# Patient Record
Sex: MALE | Race: WHITE | NOT HISPANIC OR LATINO | URBAN - METROPOLITAN AREA
[De-identification: names, ages, dates, MRNs, and addresses within clinical notes are randomized per-mention and may not be internally consistent; named-entity substitution may affect disease eponyms.]

---

## 2019-09-18 LAB
EXTERNAL HIV SCREEN: NORMAL
HCV AB SER-ACNC: NEGATIVE

## 2021-06-17 ENCOUNTER — TELEPHONE (OUTPATIENT)
Dept: NEUROLOGY | Facility: CLINIC | Age: 41
End: 2021-06-17

## 2021-06-17 NOTE — TELEPHONE ENCOUNTER
Spoke with patient states he is having order faxed over I offered to schedule as self referral and records a can be faxed  Per patient having frequent headache,blurred vision history of brain tumor  Never seen by Neurology previously seen by Neurosurgery  Offer schedule first available place   on waitlist he refused appointment states he needs to be seen asap disconnected call       Headache Blurred vision /Hx of brain tumor/[employer]

## 2021-06-18 ENCOUNTER — TELEPHONE (OUTPATIENT)
Dept: NEUROLOGY | Facility: CLINIC | Age: 41
End: 2021-06-18

## 2022-10-13 ENCOUNTER — TELEPHONE (OUTPATIENT)
Dept: ADMINISTRATIVE | Facility: OTHER | Age: 42
End: 2022-10-13

## 2022-10-13 NOTE — TELEPHONE ENCOUNTER
----- Message from Enzo Umaña sent at 10/12/2022 11:30 AM EDT -----  Regarding: Care Gap Update  10/12/22 11:30 AM    Hello, our patient attached above has had Hepatitis C completed/performed  Please assist in updating the patient chart by pulling the Care Everywhere (CE) document  The date of service is 09/18/2019  Hello, our patient attached above has had HIV completed/performed  Please assist in updating the patient chart by pulling the Care Everywhere (CE) document  The date of service is 09/18/2019         Thank you,  Enzo Umaña   [facility]

## 2022-10-13 NOTE — TELEPHONE ENCOUNTER
Upon review of the In Basket request we were able to locate, review, and update the patient chart as requested for Hepatitis C  and HIV  Any additional questions or concerns should be emailed to the Practice Liaisons via the appropriate education email address, please do not reply via In Basket      Thank you  Linsey Henderson

## 2022-11-02 ENCOUNTER — OFFICE VISIT (OUTPATIENT)
Dept: FAMILY MEDICINE CLINIC | Facility: CLINIC | Age: 42
End: 2022-11-02

## 2022-11-02 VITALS
HEART RATE: 68 BPM | OXYGEN SATURATION: 98 % | BODY MASS INDEX: 29.89 KG/M2 | TEMPERATURE: 98 F | SYSTOLIC BLOOD PRESSURE: 120 MMHG | HEIGHT: 70 IN | WEIGHT: 208.8 LBS | DIASTOLIC BLOOD PRESSURE: 78 MMHG

## 2022-11-02 DIAGNOSIS — M54.50 ACUTE BILATERAL LOW BACK PAIN WITHOUT SCIATICA: ICD-10-CM

## 2022-11-02 DIAGNOSIS — Z23 NEEDS FLU SHOT: ICD-10-CM

## 2022-11-02 DIAGNOSIS — F43.0 STRESS RESPONSE: ICD-10-CM

## 2022-11-02 DIAGNOSIS — Z00.00 ANNUAL PHYSICAL EXAM: Primary | ICD-10-CM

## 2022-11-02 DIAGNOSIS — F41.9 ANXIETY: ICD-10-CM

## 2022-11-02 DIAGNOSIS — M51.37 DDD (DEGENERATIVE DISC DISEASE), LUMBOSACRAL: ICD-10-CM

## 2022-11-02 DIAGNOSIS — Z79.891 CHRONICALLY ON OPIATE THERAPY: ICD-10-CM

## 2022-11-02 PROBLEM — G44.89 OTHER HEADACHE SYNDROME: Status: ACTIVE | Noted: 2021-11-10

## 2022-11-02 RX ORDER — UBIDECARENONE 100 MG
100 CAPSULE ORAL DAILY
COMMUNITY

## 2022-11-02 RX ORDER — OXYCODONE HYDROCHLORIDE 5 MG/1
TABLET ORAL
COMMUNITY
Start: 2022-10-27

## 2022-11-02 RX ORDER — LISINOPRIL 10 MG/1
10 TABLET ORAL DAILY
COMMUNITY

## 2022-11-02 RX ORDER — ALPRAZOLAM 0.25 MG/1
TABLET ORAL
COMMUNITY
Start: 2022-10-24

## 2022-11-02 RX ORDER — AMITRIPTYLINE HYDROCHLORIDE 10 MG/1
TABLET, FILM COATED ORAL
COMMUNITY
Start: 2022-10-24

## 2022-11-02 NOTE — PATIENT INSTRUCTIONS

## 2022-11-02 NOTE — PROGRESS NOTES
ADULT ANNUAL [facility] PRIMARY CARE    NAME: Reece Coleman  AGE: 35 y o  SEX: male  : 1980     DATE: 2022     Assessment and Plan:     Problem List Items Addressed This Visit        Musculoskeletal and Integument    DDD (degenerative disc disease), lumbosacral       Other    Back pain    Chronically on opiate therapy    Stress response    Relevant Medications    ALPRAZolam (XANAX) 0 25 mg tablet    amitriptyline (ELAVIL) 10 mg tablet    Anxiety      Other Visit Diagnoses     Annual physical exam    -  Primary    BMI 29 0-29 9,adult        Needs flu shot        Relevant Orders    influenza vaccine, quadrivalent, 0 5 mL, preservative-free, for adult and pediatric patients 6 mos+ (AFLURIA, FLUARIX, FLULAVAL, FLUZONE) (Completed)          Immunizations and preventive care screenings were discussed with patient today  Appropriate education was printed on patient's after visit summary  Discussed risks and benefits of prostate cancer screening  We discussed the controversial history of PSA screening for prostate cancer in the [place] as well as the risk of over detection and over treatment of prostate cancer by way of PSA screening  The patient understands that PSA blood testing is an imperfect way to screen for prostate cancer and that elevated PSA levels in the blood may also be caused by infection, inflammation, prostatic trauma or manipulation, urological procedures, or by benign prostatic enlargement  The role of the digital rectal examination in prostate cancer screening was also discussed and I discussed with him that there is large interobserver variability in the findings of digital rectal examination  Counseling:  · Exercise: the importance of regular exercise/physical activity was discussed  Recommend exercise 3-5 times per week for at least 30 minutes  He will follow-up with his PCP for an opiate visit in December    Note placed in chart about increasing quantity prescribed of his oxycodone (takes 2-3 times a day)  Continue with medication regimen  Return in about 8 weeks (around 12/28/2022) for opiate follow-up/pain contract        Chief Complaint:     Chief Complaint   Patient presents with   • Physical Exam      History of Present Illness:     Adult Annual Physical   Patient here for a comprehensive physical exam  The patient reports here as a new patient - hx of low back issues - on pain medication for this  Hx of anxiety and increased response to stress - takes alprazolam for this  Hx of migraines/headaches - on amitriptyiline for this  Hx of HTN - on ace inhibitor       Diet and Physical Activity  · Diet/Nutrition: well balanced diet  · Exercise: moderate cardiovascular exercise  Depression Screening  PHQ-2/9 Depression Screening    Little interest or pleasure in doing things: 0 - not at all  Feeling down, depressed, or hopeless: 0 - not at all  PHQ-2 Score: 0  PHQ-2 Interpretation: Negative depression screen       General Health  · Sleep: sleeps well  · Hearing: normal - bilateral   · Vision: no vision problems  · Dental: regular dental visits   Health  · Symptoms include: none     Review of Systems:     Review of Systems   Constitutional: Negative  Respiratory: Negative  Cardiovascular: Negative  Neurological: Positive for headaches (Hx of headaches)  All other systems reviewed and are negative       Past Medical History:     Past Medical History:   Diagnosis Date   • Hypertension    • Insomnia    • Meningitis       Past Surgical History:     Past Surgical History:   Procedure Laterality Date   • ABSCESS DRAINAGE     • COLONOSCOPY  04/05/2014   • INGUINAL HERNIA REPAIR        Family History:     Family History   Problem Relation Age of Onset   • Hypertension Mother    • Hypertension Father    • Colon cancer Paternal Grandfather       Social History:     Social History     Socioeconomic History   • Marital status: /Civil Union     Spouse name: None   • Number of children: None   • Years of education: None   • Highest education level: None   Occupational History   • None   Tobacco Use   • Smoking status: Never Smoker   • Smokeless tobacco: Never Used   Vaping Use   • Vaping Use: Never used   Substance and Sexual Activity   • Alcohol use: Not Currently   • Drug use: Never   • Sexual activity: Not Currently   Other Topics Concern   • None   Social History Narrative   • None     Social Determinants of Health     Financial Resource Strain: Not on file   Food Insecurity: Not on file   Transportation Needs: Not on file   Physical Activity: Not on file   Stress: Not on file   Social Connections: Not on file   Intimate Partner Violence: Not on file   Housing Stability: Not on file      Current Medications:     Current Outpatient Medications   Medication Sig Dispense Refill   • ALPRAZolam (XANAX) 0 25 mg tablet      • amitriptyline (ELAVIL) 10 mg tablet      • co-enzyme Q-10 100 mg capsule Take 100 mg by mouth daily     • lisinopril (ZESTRIL) 10 mg tablet Take 10 mg by mouth daily     • oxyCODONE (ROXICODONE) 5 immediate release tablet        No current facility-administered medications for this visit  Allergies:     No Known Allergies   Physical Exam:     /78 (BP Location: Left arm, Patient Position: Sitting, Cuff Size: Standard)   Pulse 68   Temp 98 °F (36 7 °C)   Ht 5' 10" (1 778 m)   Wt 94 7 kg (208 lb 12 8 oz)   SpO2 98%   BMI 29 96 kg/m²     Physical Exam  Constitutional:       Appearance: Normal appearance  HENT:      Head: Normocephalic and atraumatic  Cardiovascular:      Rate and Rhythm: Normal rate and regular rhythm  Pulses:           Dorsalis pedis pulses are 2+ on the right side and 2+ on the left side  Heart sounds: No murmur heard  No gallop  Pulmonary:      Effort: Pulmonary effort is normal  No respiratory distress        Breath sounds: Normal breath sounds  No wheezing or rales  Musculoskeletal:      Cervical back: Neck supple  Feet:      Right foot:      Skin integrity: No ulcer, skin breakdown, erythema, warmth, callus or dry skin  Left foot:      Skin integrity: No ulcer, skin breakdown, erythema, warmth, callus or dry skin  Neurological:      General: No focal deficit present  Mental Status: He is alert and oriented to person, place, and time  Mental status is at baseline  Psychiatric:         Mood and Affect: Mood normal          Behavior: Behavior normal          Thought Content:  Thought content normal          Judgment: Judgment normal           MARYELLEN Villegas  [facility] PRIMARY [facility]

## 2022-11-23 DIAGNOSIS — G89.4 CHRONIC PAIN SYNDROME: Primary | ICD-10-CM

## 2022-11-23 RX ORDER — OXYCODONE HYDROCHLORIDE 5 MG/1
5 TABLET ORAL EVERY 6 HOURS PRN
Qty: 90 TABLET | Refills: 0 | Status: SHIPPED | OUTPATIENT
Start: 2022-11-23

## 2022-12-19 DIAGNOSIS — G89.4 CHRONIC PAIN SYNDROME: ICD-10-CM

## 2022-12-19 RX ORDER — OXYCODONE HYDROCHLORIDE 5 MG/1
5 TABLET ORAL EVERY 6 HOURS PRN
Qty: 90 TABLET | Refills: 0 | Status: SHIPPED | OUTPATIENT
Start: 2022-12-19

## 2022-12-28 ENCOUNTER — OFFICE VISIT (OUTPATIENT)
Dept: FAMILY MEDICINE CLINIC | Facility: CLINIC | Age: 42
End: 2022-12-28

## 2022-12-28 VITALS
OXYGEN SATURATION: 99 % | BODY MASS INDEX: 28.63 KG/M2 | TEMPERATURE: 98.7 F | WEIGHT: 200 LBS | SYSTOLIC BLOOD PRESSURE: 141 MMHG | DIASTOLIC BLOOD PRESSURE: 73 MMHG | HEART RATE: 59 BPM | HEIGHT: 70 IN

## 2022-12-28 DIAGNOSIS — G89.4 CHRONIC PAIN SYNDROME: ICD-10-CM

## 2022-12-28 DIAGNOSIS — M51.37 DDD (DEGENERATIVE DISC DISEASE), LUMBOSACRAL: Primary | ICD-10-CM

## 2022-12-28 DIAGNOSIS — I10 BENIGN ESSENTIAL HTN: ICD-10-CM

## 2022-12-28 DIAGNOSIS — F41.9 ANXIETY: ICD-10-CM

## 2022-12-28 NOTE — PROGRESS NOTES
Assessment/Plan     Problem List Items Addressed This Visit        Cardiovascular and Mediastinum    Benign essential HTN     Continue lisinopril              Musculoskeletal and Integument    DDD (degenerative disc disease), lumbosacral - Primary     Continue meds  Contract signed            Other    Anxiety     Continue prn xanax           Treatment Plan: Contract signed  meds reviewed  Refill sent  Goals reviewed      Treatment Goals: Functionality  Mobility  Enjoyment of life    Opiate risks  There are risks associated with opioid medications, including dependence, addiction and tolerance  The patient understands and agrees to use these medications only as prescribed  Potential side effects of the medications include, but are not limited to, constipation, drowsiness, addiction, impaired judgment and risk of fatal overdose if not taken as prescribed  The patient was warned against driving while taking sedation medications  Sharing medications is a felony  At this point in time, the patient is showing no signs of addiction, abuse, diversion or suicidal ideation  Opioid agreement  Pain management agreement was reviewed with patient and signed/updated during visit      PDMP review  [place] or [place]  reviewed  No red flags were identified; safe to proceed with prescription      Greater than 50% of this time was spent in counseling/coordination of care regarding: risks and benefits of treatment options  Subjective     Opioid Management:   Type of visit: Initial    Pain related diagnoses: stable, doing well    Pain controlled on meds    They allow him to work and exercise    Current pain description: Pain daily but manageable    Functional status: He has good daily functionality    Goals of care: Exercise  Work  Enjoyment of activities/life/family/dogs    [address]  Patient receives support from their: wife, daughter and son    Screening Tools/Assessments:    PHQ-2/9:  PHQ-2 score: 0    Opioid Risk Tool (ORT):  Current ORT Score: 1 (low risk for opiate abuse)    SOAPP:  Current SOAPP Score: 1 (negative, low risk patient)    Brief Pain Inventory (BPI):  1) Throughout our lives, most of us have had pain from time to time (such as minor headaches, sprains, and toothaches)  Have you had pain other than these everyday kinds of pain today? Yes  2) Where is your pain located? low back and neck  3) Rate your pain at its worst in the last 24 hours: 4  4) Rate your pain at its least in the last 24 hours: 3  5) Rate your average level of pain: 4  6) Rate your pain right now: 3  7) What treatments or medications are you receiving for your pain? oxycodone 5mg and inversion table decompression and routine exercise and tens unit  8) In the past 24 hours, how much relief have pain treatments or medication provided? 40%  9) During the past 24 hours, pain has interfered with your:     A) General activity: 2     B) Mood: 2     C) Walking ability: 0     D) Normal work (work outside the home & housework): 2     E) Relations with other people: 0     F) Sleep: 2     G) Enjoyment of life: 0    Drug Screen:  Date of last drug screen: 12/28/2022    Opioid agreement:  Active Opioid agreement on file?: No      Naloxone:  Currently prescribed Naloxone (Narcan): No    Reason not prescribing Naloxone: patient declines    High risk medications  High risk meds taken in last 72 hours: none    Other treatments tried/failed:   Ibuprofen (OTC), muscle relaxants, naproxen (OTC), home exercises, rest, heat, physical therapy, acetaminophen, prescription NSAIDs, aspirin and narcotic analgesics    Prior referrals:  Physical therapy and Pain management    Doing well  No new issues or concerns  Contract signed today  Pain is chronic and controlled on current meds for years      Pain Medications             amitriptyline (ELAVIL) 10 mg tablet     oxyCODONE (ROXICODONE) 5 immediate release tablet Take 1 tablet (5 mg total) by mouth every 6 (six) hours as needed for moderate pain Max Daily Amount: 20 mg         Outpatient Morphine Milligram Equivalents Per Day     12/28/22 and after 30 MME/Day    Order Name Dose Route Frequency Maximum MME/Day     oxyCODONE (ROXICODONE) 5 immediate release tablet 5 mg Oral Every 6 hours PRN 30 MME/Day    Total Potential Morphine Milligram Equivalents Per Day 30 MME/Day    Calculation Information        oxyCODONE (ROXICODONE) 5 immediate release tablet    oxyCODONE 5 Tabs: maximum daily dose of 20 mg * morphine equivalence factor of 1 5 = 30 MME/Day                         PDMP Review       Value Time User    PDMP Reviewed  Yes 12/19/2022 12:49 PM David Rosario MD         Review of Systems   Respiratory: Negative  Cardiovascular: Negative  Gastrointestinal: Negative  Objective     There were no vitals taken for this visit  Physical Exam  Vitals and nursing note reviewed  Constitutional:       General: He is not in acute distress  Appearance: He is well-developed  HENT:      Head: Normocephalic and atraumatic  Eyes:      Conjunctiva/sclera: Conjunctivae normal    Cardiovascular:      Rate and Rhythm: Normal rate and regular rhythm  Heart sounds: No murmur heard  Pulmonary:      Effort: Pulmonary effort is normal  No respiratory distress  Breath sounds: Normal breath sounds  Abdominal:      Palpations: Abdomen is soft  Tenderness: There is no abdominal tenderness  Musculoskeletal:         General: No swelling  Cervical back: Neck supple  Skin:     General: Skin is warm and dry  Capillary Refill: Capillary refill takes less than 2 seconds  Neurological:      Mental Status: He is alert     Psychiatric:         Mood and Affect: Mood normal          David Rosario MD

## 2022-12-30 ENCOUNTER — TELEPHONE (OUTPATIENT)
Dept: FAMILY MEDICINE CLINIC | Facility: CLINIC | Age: 42
End: 2022-12-30

## 2022-12-31 LAB
6MAM UR QL CFM: NEGATIVE NG/ML
7AMINOCLONAZEPAM UR QL CFM: NEGATIVE NG/ML
A-OH ALPRAZ UR QL CFM: ABNORMAL NG/ML
ACCEPTABLE CREAT UR QL: NORMAL MG/DL
ACCEPTIBLE SP GR UR QL: NORMAL
AMPHET UR QL CFM: NEGATIVE NG/ML
BUPRENORPHINE UR QL CFM: NEGATIVE NG/ML
BUTALBITAL UR QL CFM: NEGATIVE NG/ML
BZE UR QL CFM: NEGATIVE NG/ML
CODEINE UR QL CFM: NEGATIVE NG/ML
EDDP UR QL CFM: NEGATIVE NG/ML
ETHYL GLUCURONIDE UR QL CFM: NEGATIVE NG/ML
ETHYL SULFATE UR QL SCN: NEGATIVE NG/ML
EUTYLONE UR QL: NEGATIVE NG/ML
FENTANYL UR QL CFM: NEGATIVE NG/ML
GLIADIN IGG SER IA-ACNC: NEGATIVE NG/ML
HYDROCODONE UR QL CFM: NEGATIVE NG/ML
HYDROMORPHONE UR QL CFM: NEGATIVE NG/ML
LORAZEPAM UR QL CFM: NEGATIVE NG/ML
ME-PHENIDATE UR QL CFM: NEGATIVE NG/ML
MEPERIDINE UR QL CFM: NEGATIVE NG/ML
METHADONE UR QL CFM: NEGATIVE NG/ML
METHAMPHET UR QL CFM: NEGATIVE NG/ML
MORPHINE UR QL CFM: NEGATIVE NG/ML
NALTREXONE UR QL CFM: NEGATIVE NG/ML
NITRITE UR QL: NORMAL UG/ML
NORBUPRENORPHINE UR QL CFM: NEGATIVE NG/ML
NORDIAZEPAM UR QL CFM: NEGATIVE NG/ML
NORFENTANYL UR QL CFM: NEGATIVE NG/ML
NORHYDROCODONE UR QL CFM: NEGATIVE NG/ML
NORMEPERIDINE UR QL CFM: NEGATIVE NG/ML
NOROXYCODONE UR QL CFM: NORMAL NG/ML
OXAZEPAM UR QL CFM: NEGATIVE NG/ML
OXYCODONE UR QL CFM: NORMAL NG/ML
OXYMORPHONE UR QL CFM: NORMAL NG/ML
OXYMORPHONE UR QL CFM: NORMAL NG/ML
PARA-FLUOROFENTANYL QUANTIFICATION: NORMAL NG/ML
PHENOBARB UR QL CFM: NEGATIVE NG/ML
RESULT ALL_PRESCRIBED MEDS AND SPECIAL INSTRUCTIONS: NORMAL
SECOBARBITAL UR QL CFM: NEGATIVE NG/ML
SL AMB 4-ANPP QUANTIFICATION: NORMAL NG/ML
SL AMB 5F-ADB-M7 METABOLITE QUANTIFICATION: NEGATIVE NG/ML
SL AMB 7-OH-MITRAGYNINE (KRATOM ALKALOID) QUANTIFICATION: NEGATIVE NG/ML
SL AMB AB-FUBINACA-M3 METABOLITE QUANTIFICATION: NEGATIVE NG/ML
SL AMB ACETYL FENTANYL QUANTIFICATION: NORMAL NG/ML
SL AMB ACETYL NORFENTANYL QUANTIFICATION: NORMAL NG/ML
SL AMB ACRYL FENTANYL QUANTIFICATION: NORMAL NG/ML
SL AMB CARFENTANIL QUANTIFICATION: NORMAL NG/ML
SL AMB CTHC (MARIJUANA METABOLITE) QUANTIFICATION: NEGATIVE NG/ML
SL AMB DEXTRORPHAN (DEXTROMETHORPHAN METABOLITE) QUANT: NEGATIVE NG/ML
SL AMB GABAPENTIN QUANTIFICATION: NEGATIVE
SL AMB JWH018 METABOLITE QUANTIFICATION: NEGATIVE NG/ML
SL AMB JWH073 METABOLITE QUANTIFICATION: NEGATIVE NG/ML
SL AMB MDMB-FUBINACA-M1 METABOLITE QUANTIFICATION: NEGATIVE NG/ML
SL AMB METHYLONE QUANTIFICATION: NEGATIVE NG/ML
SL AMB N-DESMETHYL-TRAMADOL QUANTIFICATION: NEGATIVE NG/ML
SL AMB PHENTERMINE QUANTIFICATION: NEGATIVE NG/ML
SL AMB PREGABALIN QUANTIFICATION: NEGATIVE
SL AMB RCS4 METABOLITE QUANTIFICATION: NEGATIVE NG/ML
SL AMB RITALINIC ACID QUANTIFICATION: NEGATIVE NG/ML
SMOOTH MUSCLE AB TITR SER IF: NEGATIVE NG/ML
SPECIMEN DRAWN SERPL: NEGATIVE NG/ML
SPECIMEN PH ACCEPTABLE UR: NORMAL
TAPENTADOL UR QL CFM: NEGATIVE NG/ML
TEMAZEPAM UR QL CFM: NEGATIVE NG/ML
TEMAZEPAM UR QL CFM: NEGATIVE NG/ML
TRAMADOL UR QL CFM: NEGATIVE NG/ML
URATE/CREAT 24H UR: NEGATIVE NG/ML

## 2023-01-17 DIAGNOSIS — G89.4 CHRONIC PAIN SYNDROME: ICD-10-CM

## 2023-01-17 RX ORDER — OXYCODONE HYDROCHLORIDE 5 MG/1
5 TABLET ORAL EVERY 6 HOURS PRN
Qty: 90 TABLET | Refills: 0 | Status: SHIPPED | OUTPATIENT
Start: 2023-01-17

## 2023-02-14 DIAGNOSIS — G89.4 CHRONIC PAIN SYNDROME: ICD-10-CM

## 2023-02-14 RX ORDER — OXYCODONE HYDROCHLORIDE 5 MG/1
5 TABLET ORAL EVERY 6 HOURS PRN
Qty: 90 TABLET | Refills: 0 | Status: SHIPPED | OUTPATIENT
Start: 2023-02-14

## 2023-03-15 DIAGNOSIS — G89.4 CHRONIC PAIN SYNDROME: ICD-10-CM

## 2023-03-15 RX ORDER — OXYCODONE HYDROCHLORIDE 5 MG/1
5 TABLET ORAL EVERY 6 HOURS PRN
Qty: 90 TABLET | Refills: 0 | Status: SHIPPED | OUTPATIENT
Start: 2023-03-15

## 2023-03-27 ENCOUNTER — TELEPHONE (OUTPATIENT)
Dept: FAMILY MEDICINE CLINIC | Facility: CLINIC | Age: 43
End: 2023-03-27

## 2023-03-27 DIAGNOSIS — Z13.220 LIPID SCREENING: Primary | ICD-10-CM

## 2023-05-11 DIAGNOSIS — G89.4 CHRONIC PAIN SYNDROME: ICD-10-CM

## 2023-05-11 RX ORDER — OXYCODONE HYDROCHLORIDE 5 MG/1
5 TABLET ORAL EVERY 6 HOURS PRN
Qty: 90 TABLET | Refills: 0 | Status: SHIPPED | OUTPATIENT
Start: 2023-05-11

## 2023-05-30 DIAGNOSIS — I10 BENIGN ESSENTIAL HTN: Primary | ICD-10-CM

## 2023-05-30 RX ORDER — LISINOPRIL 10 MG/1
10 TABLET ORAL DAILY
Qty: 90 TABLET | Refills: 3 | Status: SHIPPED | OUTPATIENT
Start: 2023-05-30

## 2023-06-08 DIAGNOSIS — G89.4 CHRONIC PAIN SYNDROME: ICD-10-CM

## 2023-06-08 RX ORDER — OXYCODONE HYDROCHLORIDE 5 MG/1
5 TABLET ORAL EVERY 6 HOURS PRN
Qty: 90 TABLET | Refills: 0 | Status: SHIPPED | OUTPATIENT
Start: 2023-06-08

## 2023-07-06 DIAGNOSIS — G89.4 CHRONIC PAIN SYNDROME: ICD-10-CM

## 2023-07-06 RX ORDER — OXYCODONE HYDROCHLORIDE 5 MG/1
5 TABLET ORAL EVERY 6 HOURS PRN
Qty: 90 TABLET | Refills: 0 | Status: SHIPPED | OUTPATIENT
Start: 2023-07-06

## 2023-08-02 DIAGNOSIS — G89.4 CHRONIC PAIN SYNDROME: ICD-10-CM

## 2023-08-02 RX ORDER — OXYCODONE HYDROCHLORIDE 5 MG/1
5 TABLET ORAL EVERY 6 HOURS PRN
Qty: 90 TABLET | Refills: 0 | Status: SHIPPED | OUTPATIENT
Start: 2023-08-02

## 2023-08-30 DIAGNOSIS — G89.4 CHRONIC PAIN SYNDROME: ICD-10-CM

## 2023-08-30 RX ORDER — OXYCODONE HYDROCHLORIDE 5 MG/1
5 TABLET ORAL EVERY 6 HOURS PRN
Qty: 90 TABLET | Refills: 0 | Status: SHIPPED | OUTPATIENT
Start: 2023-08-30

## 2023-09-27 DIAGNOSIS — G89.4 CHRONIC PAIN SYNDROME: ICD-10-CM

## 2023-09-27 RX ORDER — OXYCODONE HYDROCHLORIDE 5 MG/1
5 TABLET ORAL EVERY 6 HOURS PRN
Qty: 90 TABLET | Refills: 0 | Status: SHIPPED | OUTPATIENT
Start: 2023-09-27

## 2023-10-04 DIAGNOSIS — F41.9 ANXIETY: Primary | ICD-10-CM

## 2023-10-04 RX ORDER — ALPRAZOLAM 0.25 MG/1
0.25 TABLET ORAL 3 TIMES DAILY PRN
Qty: 90 TABLET | Refills: 1 | Status: SHIPPED | OUTPATIENT
Start: 2023-10-04

## 2023-10-25 DIAGNOSIS — G89.4 CHRONIC PAIN SYNDROME: ICD-10-CM

## 2023-10-25 RX ORDER — OXYCODONE HYDROCHLORIDE 5 MG/1
5 TABLET ORAL EVERY 6 HOURS PRN
Qty: 90 TABLET | Refills: 0 | Status: SHIPPED | OUTPATIENT
Start: 2023-10-25

## 2023-11-02 ENCOUNTER — OFFICE VISIT (OUTPATIENT)
Dept: FAMILY MEDICINE CLINIC | Facility: CLINIC | Age: 43
End: 2023-11-02
Payer: COMMERCIAL

## 2023-11-02 VITALS
WEIGHT: 213.6 LBS | BODY MASS INDEX: 30.58 KG/M2 | SYSTOLIC BLOOD PRESSURE: 141 MMHG | HEIGHT: 70 IN | OXYGEN SATURATION: 99 % | DIASTOLIC BLOOD PRESSURE: 79 MMHG | HEART RATE: 72 BPM

## 2023-11-02 DIAGNOSIS — Z00.00 ANNUAL PHYSICAL EXAM: Primary | ICD-10-CM

## 2023-11-02 PROCEDURE — 99396 PREV VISIT EST AGE 40-64: CPT | Performed by: FAMILY MEDICINE

## 2023-11-02 NOTE — PROGRESS NOTES
Assessment/Plan:       1. Annual physical exam  Assessment & Plan:  Reviewed age-appropriate health maintenance and preventive care. Subjective:      Patient ID: Janeth Bill is a 38 y.o. male. Rivas Decker is doing great. No concerns. Meds/problems reviewed. Labs ordered. He needs no refills. He works out and exercises. Diet is good. 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 Depression Screening and Follow-up Plan: Patient was screened for depression during today's encounter. They screened negative with a PHQ-2 score of 1. The following portions of the patient's history were reviewed and updated as appropriate: allergies, current medications, past family history, past medical history, past social history, past surgical history, and problem list.    Review of Systems   Respiratory: Negative. Cardiovascular: Negative. Objective:      /79 (BP Location: Left arm, Patient Position: Sitting, Cuff Size: Large)   Pulse 72   Ht 5' 10" (1.778 m)   Wt 96.9 kg (213 lb 9.6 oz)   SpO2 99%   BMI 30.65 kg/m²          Physical Exam  Vitals and nursing note reviewed. Constitutional:       Appearance: Normal appearance. HENT:      Head: Normocephalic and atraumatic. Nose: Nose normal.      Mouth/Throat:      Mouth: Mucous membranes are moist.   Eyes:      Extraocular Movements: Extraocular movements intact. Pupils: Pupils are equal, round, and reactive to light. Cardiovascular:      Rate and Rhythm: Normal rate and regular rhythm. Pulses: Normal pulses. Pulmonary:      Effort: Pulmonary effort is normal.      Breath sounds: Normal breath sounds. Abdominal:      General: Bowel sounds are normal.      Palpations: Abdomen is soft. Musculoskeletal:      Cervical back: Normal range of motion. Skin:     General: Skin is warm and dry. Capillary Refill: Capillary refill takes less than 2 seconds. Neurological:      General: No focal deficit present.       Mental Status: He is alert.    Psychiatric:         Mood and Affect: Mood normal.

## 2023-11-21 DIAGNOSIS — G89.4 CHRONIC PAIN SYNDROME: ICD-10-CM

## 2023-11-21 RX ORDER — OXYCODONE HYDROCHLORIDE 5 MG/1
5 TABLET ORAL EVERY 6 HOURS PRN
Qty: 90 TABLET | Refills: 0 | Status: SHIPPED | OUTPATIENT
Start: 2023-11-21

## 2023-12-15 DIAGNOSIS — G89.4 CHRONIC PAIN SYNDROME: ICD-10-CM

## 2023-12-15 RX ORDER — OXYCODONE HYDROCHLORIDE 5 MG/1
5 TABLET ORAL EVERY 6 HOURS PRN
Qty: 90 TABLET | Refills: 0 | Status: SHIPPED | OUTPATIENT
Start: 2023-12-15

## 2024-01-16 DIAGNOSIS — G89.4 CHRONIC PAIN SYNDROME: ICD-10-CM

## 2024-01-16 RX ORDER — OXYCODONE HYDROCHLORIDE 5 MG/1
5 TABLET ORAL EVERY 6 HOURS PRN
Qty: 90 TABLET | Refills: 0 | Status: SHIPPED | OUTPATIENT
Start: 2024-01-16

## 2024-02-14 DIAGNOSIS — G89.4 CHRONIC PAIN SYNDROME: ICD-10-CM

## 2024-02-14 RX ORDER — OXYCODONE HYDROCHLORIDE 5 MG/1
5 TABLET ORAL EVERY 6 HOURS PRN
Qty: 90 TABLET | Refills: 0 | Status: SHIPPED | OUTPATIENT
Start: 2024-02-14

## 2024-03-14 DIAGNOSIS — G89.4 CHRONIC PAIN SYNDROME: ICD-10-CM

## 2024-03-14 RX ORDER — OXYCODONE HYDROCHLORIDE 5 MG/1
5 TABLET ORAL EVERY 6 HOURS PRN
Qty: 90 TABLET | Refills: 0 | Status: SHIPPED | OUTPATIENT
Start: 2024-03-14

## 2024-03-21 ENCOUNTER — TELEPHONE (OUTPATIENT)
Age: 44
End: 2024-03-21

## 2024-03-21 NOTE — TELEPHONE ENCOUNTER
[facility] COLON     Screened by: Marilin Chavez MA    Referring Provider pcp    Pre- Screening:     There is no height or weight on file to calculate BMI.  Has patient been referred for a routine screening Colonoscopy? no  Is the patient between 45-75 years old? yes      Previous Colonoscopy yes   If yes:    Date: 10 years    Facility:     Reason:       Does the patient want to see a Gastroenterologist prior to their procedure OR are they having any GI symptoms? no    Has the patient been hospitalized or had abdominal surgery in the past 6 months? no    Does the patient use supplemental oxygen? no    Does the patient take Coumadin, Lovenox, Plavix, Elliquis, Xarelto, or other blood thinning medication? no    Has the patient had a stroke, cardiac event, or stent placed in the past year? no     Colonoscopy scheduled    If patient is between 45yrs - 49yrs, please advise patient that we will have to confirm benefits & coverage with their insurance company for a routine screening colonoscopy.      ASC Screening    ASC Screening  BMI > than 45: No  Are you currently pregnant?: No  Do you rely on a wheelchair for mobility?: No  Do you need oxygen during the day?: No  Have you ever been informed by anesthesia that you have a difficult airway?: No  Have you been diagnosed with End Stage Renal Disease (ESRD)?: No  Are you actively on dialysis?: No  Have you been diagnosed with Pulmonary Hypertension?: No  Do you have a pacemaker or an Automatic Implantable Cardioverter Defibrillator (AICD)?: No  Have you ever had an organ transplant?: No  Have you had a stroke, heart attack, myocardial infarction (MI) within the last 6 months?: No  Have you ever been diagnosed with Aortic Stenosis?: No  Have you ever been diagnosed  with Congestive Heart Failure?: No  Have you ever been diagnosed with a heart valve disease?: No  Are you Diabetic?: No  If you are Diabetic, has your A1C been greater than 12 within the last six months?: N/A        Scheduled date of colonoscopy (as of today):5/7/2024  Physician performing colonoscopy:NANCY  Location of colonoscopy:[facility]  Bowel prep reviewed with patient:M&D  Instructions reviewed with patient by:nw  Clearances: none

## 2024-03-28 LAB
ALBUMIN SERPL-MCNC: 4.5 G/DL (ref 3.6–5.1)
ALBUMIN/GLOB SERPL: 1.7 (CALC) (ref 1–2.5)
ALP SERPL-CCNC: 59 U/L (ref 36–130)
ALT SERPL-CCNC: 14 U/L (ref 9–46)
AST SERPL-CCNC: 20 U/L (ref 10–40)
BASOPHILS # BLD AUTO: 32 CELLS/UL (ref 0–200)
BASOPHILS NFR BLD AUTO: 0.6 %
BILIRUB SERPL-MCNC: 0.6 MG/DL (ref 0.2–1.2)
BUN SERPL-MCNC: 17 MG/DL (ref 7–25)
BUN/CREAT SERPL: NORMAL (CALC) (ref 6–22)
CALCIUM SERPL-MCNC: 9.9 MG/DL (ref 8.6–10.3)
CHLORIDE SERPL-SCNC: 103 MMOL/L (ref 98–110)
CHOLEST SERPL-MCNC: 136 MG/DL
CHOLEST/HDLC SERPL: 2.6 (CALC)
CO2 SERPL-SCNC: 29 MMOL/L (ref 20–32)
CREAT SERPL-MCNC: 1.25 MG/DL (ref 0.6–1.29)
EOSINOPHIL # BLD AUTO: 248 CELLS/UL (ref 15–500)
EOSINOPHIL NFR BLD AUTO: 4.6 %
ERYTHROCYTE [DISTWIDTH] IN BLOOD BY AUTOMATED COUNT: 12.6 % (ref 11–15)
GFR/BSA.PRED SERPLBLD CYS-BASED-ARV: 72 ML/MIN/1.73M2
GLOBULIN SER CALC-MCNC: 2.6 G/DL (CALC) (ref 1.9–3.7)
GLUCOSE SERPL-MCNC: 94 MG/DL (ref 65–99)
HCT VFR BLD AUTO: 45.7 % (ref 38.5–50)
HDLC SERPL-MCNC: 52 MG/DL
HGB BLD-MCNC: 15.8 G/DL (ref 13.2–17.1)
LDLC SERPL CALC-MCNC: 67 MG/DL (CALC)
LYMPHOCYTES # BLD AUTO: 2408 CELLS/UL (ref 850–3900)
LYMPHOCYTES NFR BLD AUTO: 44.6 %
MCH RBC QN AUTO: 30.6 PG (ref 27–33)
MCHC RBC AUTO-ENTMCNC: 34.6 G/DL (ref 32–36)
MCV RBC AUTO: 88.4 FL (ref 80–100)
MONOCYTES # BLD AUTO: 486 CELLS/UL (ref 200–950)
MONOCYTES NFR BLD AUTO: 9 %
NEUTROPHILS # BLD AUTO: 2225 CELLS/UL (ref 1500–7800)
NEUTROPHILS NFR BLD AUTO: 41.2 %
NONHDLC SERPL-MCNC: 84 MG/DL (CALC)
PLATELET # BLD AUTO: 230 THOUSAND/UL (ref 140–400)
PMV BLD REES-ECKER: 10.4 FL (ref 7.5–12.5)
POTASSIUM SERPL-SCNC: 3.9 MMOL/L (ref 3.5–5.3)
PROT SERPL-MCNC: 7.1 G/DL (ref 6.1–8.1)
RBC # BLD AUTO: 5.17 MILLION/UL (ref 4.2–5.8)
SODIUM SERPL-SCNC: 140 MMOL/L (ref 135–146)
TESTOST SERPL-MCNC: 382 NG/DL (ref 250–827)
TRIGL SERPL-MCNC: 90 MG/DL
WBC # BLD AUTO: 5.4 THOUSAND/UL (ref 3.8–10.8)

## 2024-03-29 ENCOUNTER — TELEPHONE (OUTPATIENT)
Dept: FAMILY MEDICINE CLINIC | Facility: CLINIC | Age: 44
End: 2024-03-29

## 2024-04-10 ENCOUNTER — OFFICE VISIT (OUTPATIENT)
Dept: FAMILY MEDICINE CLINIC | Facility: CLINIC | Age: 44
End: 2024-04-10
Payer: COMMERCIAL

## 2024-04-10 ENCOUNTER — TELEPHONE (OUTPATIENT)
Age: 44
End: 2024-04-10

## 2024-04-10 VITALS
DIASTOLIC BLOOD PRESSURE: 78 MMHG | HEIGHT: 70 IN | WEIGHT: 215 LBS | BODY MASS INDEX: 30.78 KG/M2 | SYSTOLIC BLOOD PRESSURE: 134 MMHG | OXYGEN SATURATION: 97 % | HEART RATE: 84 BPM

## 2024-04-10 DIAGNOSIS — K92.1 BLOOD IN STOOL: ICD-10-CM

## 2024-04-10 DIAGNOSIS — R53.83 OTHER FATIGUE: ICD-10-CM

## 2024-04-10 DIAGNOSIS — K59.01 SLOW TRANSIT CONSTIPATION: Primary | ICD-10-CM

## 2024-04-10 DIAGNOSIS — R63.5 WEIGHT GAIN: ICD-10-CM

## 2024-04-10 PROCEDURE — 99214 OFFICE O/P EST MOD 30 MIN: CPT | Performed by: FAMILY MEDICINE

## 2024-04-10 NOTE — PROGRESS NOTES
Assessment/Plan:       1. Slow transit constipation  Comments:  check labs  start fiber/water/probiotic/stool softener  Orders:  -     TSH, 3rd generation with Free T4 reflex; Future; Expected date: 04/10/2024  -     T4; Future  -     T4, free; Future  -     T3; Future  -     Vitamin B12; Future  -     Vitamin D 1,25 dihydroxy; Future  -     Lyme Total AB W Reflex to IGM/IGG; Future  -     CT abdomen pelvis w contrast; Future; Expected date: 04/10/2024  -     TSH, 3rd generation with Free T4 reflex  -     T4  -     T4, free  -     T3  -     Vitamin B12  -     Vitamin D 1,25 dihydroxy  -     Lyme Total AB W Reflex to IGM/IGG    2. Blood in stool  Comments:  stool softener  pribiotic  fiber/water  metamucil  Orders:  -     TSH, 3rd generation with Free T4 reflex; Future; Expected date: 04/10/2024  -     T4; Future  -     T4, free; Future  -     T3; Future  -     Vitamin B12; Future  -     Vitamin D 1,25 dihydroxy; Future  -     Lyme Total AB W Reflex to IGM/IGG; Future  -     CT abdomen pelvis w contrast; Future; Expected date: 04/10/2024  -     TSH, 3rd generation with Free T4 reflex  -     T4  -     T4, free  -     T3  -     Vitamin B12  -     Vitamin D 1,25 dihydroxy  -     Lyme Total AB W Reflex to IGM/IGG    3. Weight gain  Comments:  check tsh  Orders:  -     TSH, 3rd generation with Free T4 reflex; Future; Expected date: 04/10/2024  -     T4; Future  -     T4, free; Future  -     T3; Future  -     Vitamin B12; Future  -     Vitamin D 1,25 dihydroxy; Future  -     Lyme Total AB W Reflex to IGM/IGG; Future  -     CT abdomen pelvis w contrast; Future; Expected date: 04/10/2024  -     TSH, 3rd generation with Free T4 reflex  -     T4  -     T4, free  -     T3  -     Vitamin B12  -     Vitamin D 1,25 dihydroxy  -     Lyme Total AB W Reflex to IGM/IGG    4. Other fatigue  Comments:  check labs  Orders:  -     TSH, 3rd generation with Free T4 reflex; Future; Expected date: 04/10/2024  -     T4; Future  -     T4, free;  "Future  -     T3; Future  -     Vitamin B12; Future  -     Vitamin D 1,25 dihydroxy; Future  -     Lyme Total AB W Reflex to IGM/IGG; Future  -     CT abdomen pelvis w contrast; Future; Expected date: 04/10/2024  -     TSH, 3rd generation with Free T4 reflex  -     T4  -     T4, free  -     T3  -     Vitamin B12  -     Vitamin D 1,25 dihydroxy  -     Lyme Total AB W Reflex to IGM/IGG          Subjective:      Patient ID: Adan Davis is a 43 y.o. male.    Patient has been experiencing right upper quadrant pain and flank pain without urinary symptoms for several weeks associated with extreme fatigue and some constipation with bright red blood when he goes to the bathroom no black stool or melena.  No fever.  No weight loss but weight gain.  His main symptoms are constipation weight gain and fatigue.        The following portions of the patient's history were reviewed and updated as appropriate: allergies, current medications, past family history, past medical history, past social history, past surgical history, and problem list.    Review of Systems   Respiratory: Negative.     Cardiovascular: Negative.    Gastrointestinal:  Positive for blood in stool.         Objective:      /78 (BP Location: Left arm, Patient Position: Sitting, Cuff Size: Large)   Pulse 84   Ht 5' 10\" (1.778 m)   Wt 97.5 kg (215 lb)   SpO2 97%   BMI 30.85 kg/m²          Physical Exam  Vitals and nursing note reviewed.   Constitutional:       Appearance: Normal appearance.   HENT:      Head: Normocephalic and atraumatic.      Nose: Nose normal.      Mouth/Throat:      Mouth: Mucous membranes are moist.   Eyes:      Extraocular Movements: Extraocular movements intact.      Pupils: Pupils are equal, round, and reactive to light.   Cardiovascular:      Rate and Rhythm: Normal rate and regular rhythm.      Pulses: Normal pulses.   Pulmonary:      Effort: Pulmonary effort is normal.      Breath sounds: Normal breath sounds.   Abdominal:      " General: Bowel sounds are normal.      Palpations: Abdomen is soft.   Musculoskeletal:      Cervical back: Normal range of motion.   Skin:     General: Skin is warm and dry.      Capillary Refill: Capillary refill takes less than 2 seconds.   Neurological:      General: No focal deficit present.      Mental Status: He is alert.   Psychiatric:         Mood and Affect: Mood normal.

## 2024-04-10 NOTE — PROGRESS NOTES
Assessment/Plan:       1. Slow transit constipation  -     TSH, 3rd generation with Free T4 reflex; Future; Expected date: 04/10/2024  -     T4; Future  -     T4, free; Future  -     T3; Future  -     Vitamin B12; Future  -     Vitamin D 1,25 dihydroxy; Future  -     Lyme Total AB W Reflex to IGM/IGG; Future  -     CT abdomen pelvis w contrast; Future; Expected date: 04/10/2024  -     TSH, 3rd generation with Free T4 reflex  -     T4  -     T4, free  -     T3  -     Vitamin B12  -     Vitamin D 1,25 dihydroxy  -     Lyme Total AB W Reflex to IGM/IGG    2. Blood in stool  Comments:  stool softener  pribiotic  fiber/water  metamucil  Orders:  -     TSH, 3rd generation with Free T4 reflex; Future; Expected date: 04/10/2024  -     T4; Future  -     T4, free; Future  -     T3; Future  -     Vitamin B12; Future  -     Vitamin D 1,25 dihydroxy; Future  -     Lyme Total AB W Reflex to IGM/IGG; Future  -     CT abdomen pelvis w contrast; Future; Expected date: 04/10/2024  -     TSH, 3rd generation with Free T4 reflex  -     T4  -     T4, free  -     T3  -     Vitamin B12  -     Vitamin D 1,25 dihydroxy  -     Lyme Total AB W Reflex to IGM/IGG    3. Weight gain  Comments:  check tsh  Orders:  -     TSH, 3rd generation with Free T4 reflex; Future; Expected date: 04/10/2024  -     T4; Future  -     T4, free; Future  -     T3; Future  -     Vitamin B12; Future  -     Vitamin D 1,25 dihydroxy; Future  -     Lyme Total AB W Reflex to IGM/IGG; Future  -     CT abdomen pelvis w contrast; Future; Expected date: 04/10/2024  -     TSH, 3rd generation with Free T4 reflex  -     T4  -     T4, free  -     T3  -     Vitamin B12  -     Vitamin D 1,25 dihydroxy  -     Lyme Total AB W Reflex to IGM/IGG    4. Other fatigue  Comments:  check labs  Orders:  -     TSH, 3rd generation with Free T4 reflex; Future; Expected date: 04/10/2024  -     T4; Future  -     T4, free; Future  -     T3; Future  -     Vitamin B12; Future  -     Vitamin D 1,25  "dihydroxy; Future  -     Lyme Total AB W Reflex to IGM/IGG; Future  -     CT abdomen pelvis w contrast; Future; Expected date: 04/10/2024  -     TSH, 3rd generation with Free T4 reflex  -     T4  -     T4, free  -     T3  -     Vitamin B12  -     Vitamin D 1,25 dihydroxy  -     Lyme Total AB W Reflex to IGM/IGG          Subjective:      Patient ID: Adan Davis is a 43 y.o. male.    5-6 months ago rightback pain that mildly radiated to his RUQ.  This was associated with fatigue.  His diet has been off the past 3-4 weeks.  He has decreased appetite.  No weight loss.  He does usually eat healthy.  He feels constipated.  He is extremely tired.  There is red blood in the stool.  No black stool.  He has to push.  He feels an incomplete evacuation.  There is discomfort going.  There is blood with wiping.  The stool is hard.  He is drinking liquids.  He does have a colonoscopy scheduled.  He continues to have pain in the ruq and right flank.      Constipation        The following portions of the patient's history were reviewed and updated as appropriate: allergies, current medications, past family history, past medical history, past social history, past surgical history, and problem list.    Review of Systems   Gastrointestinal:  Positive for constipation.         Objective:      /78 (BP Location: Left arm, Patient Position: Sitting, Cuff Size: Large)   Pulse 84   Ht 5' 10\" (1.778 m)   Wt 97.5 kg (215 lb)   SpO2 97%   BMI 30.85 kg/m²          Physical Exam  Vitals and nursing note reviewed.   Constitutional:       Appearance: Normal appearance.   HENT:      Head: Normocephalic and atraumatic.      Nose: Nose normal.      Mouth/Throat:      Mouth: Mucous membranes are moist.   Eyes:      Extraocular Movements: Extraocular movements intact.      Pupils: Pupils are equal, round, and reactive to light.   Cardiovascular:      Rate and Rhythm: Normal rate and regular rhythm.      Pulses: Normal pulses.   Pulmonary:    "   Effort: Pulmonary effort is normal.      Breath sounds: Normal breath sounds.   Abdominal:      General: Bowel sounds are normal. There is no distension.      Palpations: Abdomen is soft. There is no mass.      Tenderness: There is abdominal tenderness. There is no right CVA tenderness, left CVA tenderness, guarding or rebound.   Musculoskeletal:      Cervical back: Normal range of motion.   Skin:     General: Skin is warm and dry.      Capillary Refill: Capillary refill takes less than 2 seconds.   Neurological:      General: No focal deficit present.      Mental Status: He is alert.   Psychiatric:         Mood and Affect: Mood normal.

## 2024-04-10 NOTE — TELEPHONE ENCOUNTER
Patient contacted office for sooner procedure. No sooner dates available. Patient placed of waitlist.

## 2024-04-12 ENCOUNTER — TELEPHONE (OUTPATIENT)
Age: 44
End: 2024-04-12

## 2024-04-12 DIAGNOSIS — G89.4 CHRONIC PAIN SYNDROME: ICD-10-CM

## 2024-04-12 RX ORDER — OXYCODONE HYDROCHLORIDE 5 MG/1
5 TABLET ORAL EVERY 6 HOURS PRN
Qty: 90 TABLET | Refills: 0 | Status: SHIPPED | OUTPATIENT
Start: 2024-04-12

## 2024-04-12 NOTE — TELEPHONE ENCOUNTER
PA for oxyCODONE (ROXICODONE) 5 immediate release tablet     Submitted via    []CMM-KEY   [x]EXTR[employer]AN[employer]-Case ID # [id number]   []Faxed to plan   []Other website   []Phone call Case ID #     Office notes sent, clinical questions answered. Awaiting determination    Turnaround time for your insurance to make a decision on your Prior Authorization can take 7-21 business days.

## 2024-04-12 NOTE — TELEPHONE ENCOUNTER
PA for oxyCODONE (ROXICODONE) 5 immediate release tablet  Approved   Date(s) approved 4/12/24-10/9/24  Case #    Patient advised by [x] [employer] Message                      [x] Phone call       Pharmacy advised by [x]Fax                                     []Phone call    Approval letter scanned into Media Yes

## 2024-04-13 LAB
T3 SERPL-MCNC: 92 NG/DL (ref 76–181)
T4 FREE SERPL-MCNC: 1.3 NG/DL (ref 0.8–1.8)
T4 SERPL-MCNC: 8.1 MCG/DL (ref 4.9–10.5)
TSH SERPL-ACNC: 2.61 MIU/L (ref 0.4–4.5)
VIT B12 SERPL-MCNC: 876 PG/ML (ref 200–1100)

## 2024-04-14 LAB
B BURGDOR IGG SER IA-ACNC: <=0.9 INDEX
B BURGDOR IGG+IGM SER QL IA: 0.99 INDEX
B BURGDOR IGM SER IA-ACNC: <=0.9 INDEX

## 2024-04-15 ENCOUNTER — TELEPHONE (OUTPATIENT)
Dept: FAMILY MEDICINE CLINIC | Facility: CLINIC | Age: 44
End: 2024-04-15

## 2024-04-15 ENCOUNTER — TELEPHONE (OUTPATIENT)
Age: 44
End: 2024-04-15

## 2024-04-15 NOTE — TELEPHONE ENCOUNTER
Patient called in regards to wanting to know how long should a person wait to use the bathroom. Patient stated he has not used the bathroom in 4 days and is only having discomfort. Patient would like advise.

## 2024-04-15 NOTE — TELEPHONE ENCOUNTER
----- Message from Robert Budinetz, MD sent at 4/15/2024 10:36 AM EDT -----  Labs reviewed and are within normal limits.  Please inform the patient.

## 2024-04-16 LAB
1,25(OH)2D SERPL-MCNC: 53 PG/ML (ref 18–72)
1,25(OH)2D2 SERPL-MCNC: <8 PG/ML
1,25(OH)2D3 SERPL-MCNC: 53 PG/ML
T3 SERPL-MCNC: 92 NG/DL (ref 76–181)
T4 FREE SERPL-MCNC: 1.3 NG/DL (ref 0.8–1.8)
T4 SERPL-MCNC: 8.1 MCG/DL (ref 4.9–10.5)
TSH SERPL-ACNC: 2.61 MIU/L (ref 0.4–4.5)
VIT B12 SERPL-MCNC: 876 PG/ML (ref 200–1100)

## 2024-04-16 NOTE — TELEPHONE ENCOUNTER
Spoke with patient, he reports he has been taking colace, metamucil and increased his water intake. He has not had a bowel movement since Saturday, he denies abdominal pain, or distention, says he feels a little discomfort. He also reports passing a little gas. He is just concerned that there may be underlying issue, please advise

## 2024-04-16 NOTE — TELEPHONE ENCOUNTER
Pt decided to go to a different imaging center than originally decided on for the authorization. Could we please edit and fax to [facility] imaging at [phone]

## 2024-04-19 ENCOUNTER — TELEPHONE (OUTPATIENT)
Dept: FAMILY MEDICINE CLINIC | Facility: CLINIC | Age: 44
End: 2024-04-19

## 2024-04-19 NOTE — TELEPHONE ENCOUNTER
----- Message from Robert Budinetz, MD sent at 4/19/2024  8:37 AM EDT -----  CT scan is normal  Is he doing any better with the constipation?

## 2024-04-23 ENCOUNTER — ANESTHESIA (OUTPATIENT)
Dept: ANESTHESIOLOGY | Facility: HOSPITAL | Age: 44
End: 2024-04-23

## 2024-04-23 ENCOUNTER — OFFICE VISIT (OUTPATIENT)
Dept: FAMILY MEDICINE CLINIC | Facility: CLINIC | Age: 44
End: 2024-04-23
Payer: COMMERCIAL

## 2024-04-23 ENCOUNTER — ANESTHESIA EVENT (OUTPATIENT)
Dept: ANESTHESIOLOGY | Facility: HOSPITAL | Age: 44
End: 2024-04-23

## 2024-04-23 VITALS
SYSTOLIC BLOOD PRESSURE: 128 MMHG | HEART RATE: 58 BPM | OXYGEN SATURATION: 100 % | DIASTOLIC BLOOD PRESSURE: 73 MMHG | BODY MASS INDEX: 30.21 KG/M2 | HEIGHT: 70 IN | WEIGHT: 211 LBS

## 2024-04-23 DIAGNOSIS — I10 BENIGN ESSENTIAL HTN: ICD-10-CM

## 2024-04-23 DIAGNOSIS — K59.01 SLOW TRANSIT CONSTIPATION: Primary | ICD-10-CM

## 2024-04-23 PROCEDURE — 99214 OFFICE O/P EST MOD 30 MIN: CPT | Performed by: FAMILY MEDICINE

## 2024-04-23 NOTE — PROGRESS NOTES
Assessment/Plan     Problem List Items Addressed This Visit       Benign essential HTN     D/c lisinopril          Other Visit Diagnoses       Slow transit constipation    -  Primary    stop the sweets/candy  continue fiber/water/probiotic  cut back the oxycodone           Treatment Plan: Decrease oxycodne dosage    Treatment Goals: Allow good qol  Functionality  mobility    Opiate risks  There are risks associated with opioid medications, including dependence, addiction and tolerance. The patient understands and agrees to use these medications only as prescribed. Potential side effects of the medications include, but are not limited to, constipation, drowsiness, addiction, impaired judgment and risk of fatal overdose if not taken as prescribed. The patient was warned against driving while taking sedation medications.  Sharing medications is a felony. At this point in time, the patient is showing no signs of addiction, abuse, diversion or suicidal ideation.      [place] review  [place] or [place]  reviewed. No red flags were identified; safe to proceed with prescription      discussing prognosis and instructions for management.       Depression Screening and Follow-up Plan: Patient was screened for depression during today's encounter. They screened negative with a PHQ-2 score of 0.       Subjective     Opioid Management:   Type of visit: Follow-up    Pain related diagnoses: stable, doing well    Interval history: Chronic neck and back pain  Agreeable to decreasing the dosage/frequency    Aberrant behavior?: No      Adverse effects from medication?: No      Screening Tools/Assessments:    PHQ-2/9:  PHQ-2 score: 0    Brief Pain Inventory (BPI):  1) Throughout our lives, most of us have had pain from time to time (such as minor headaches, sprains, and toothaches). Have you had pain other than these everyday kinds of pain today? Yes  2) Where is your pain located? neck and back  3) Rate your pain at its worst in the last 24  hours: 3  4) Rate your pain at its least in the last 24 hours: 2  5) Rate your average level of pain: 3  6) Rate your pain right now: 3  7) What treatments or medications are you receiving for your pain? oxycodone  8) In the past 24 hours, how much relief have pain treatments or medication provided? 70%  9) During the past 24 hours, pain has interfered with your:     A) General activity: 0     B) Mood: 0     C) Walking ability: 2     D) Normal work (work outside the home & housework): 0     E) Relations with other people: 0     F) Sleep: 3     G) Enjoyment of life: 0    COMM:  Current COMM Score: 6 (negative, low risk patient)    Drug Screen:    Drug screen result based off current prescriptions: consistent    Opioid agreement:  Active Opioid agreement on file?: No      Naloxone:  Currently prescribed Naloxone (Narcan): No      High risk medications  High risk meds taken in last 72 hours: no    Adan is here for follow-up.  He did start the probiotic and the fiber the MiraLAX as needed he is still constipated.  He goes every 3 to 4 days CT scan was normal did not comment on constipation or any significant findings in the abdomen or pelvis.  The only changes have been has been eating more sweets or candies left over from [facility].  We are also going to decrease his narcotic dosage to see if that helps.  He does have a colonoscopy scheduled on the seventh.  BP is well controlled.  Ok to stop lisinopril.      Pain Medications               amitriptyline (ELAVIL) 10 mg tablet     oxyCODONE (ROXICODONE) 5 immediate release tablet Take 1 tablet (5 mg total) by mouth every 6 (six) hours as needed for moderate pain Max Daily Amount: 20 mg           Outpatient Morphine Milligram Equivalents Per Day       4/23/24 and after 30 MME/Day      Order Name Dose Route Frequency Maximum MME/Day     oxyCODONE (ROXICODONE) 5 immediate release tablet 5 mg Oral Every 6 hours PRN 30 MME/Day    Total Potential Morphine Milligram Equivalents  "Per Day 30 MME/Day      Calculation Information          oxyCODONE (ROXICODONE) 5 immediate release tablet    oxyCODONE 5 Tabs: maximum daily dose of 20 mg * morphine equivalence factor of 1.5 = 30 MME/Day                                 PDMP Review         Value Time User    PDMP Reviewed  Yes 4/12/2024 12:15 PM Robert Budinetz, MD           Review of Systems   All other systems reviewed and are negative.    Objective     /73 (BP Location: Left arm, Patient Position: Sitting, Cuff Size: Large)   Pulse 58   Ht 5' 10\" (1.778 m)   Wt 95.7 kg (211 lb)   SpO2 100%   BMI 30.28 kg/m²     Physical Exam  Vitals and nursing note reviewed.   Constitutional:       General: He is not in acute distress.     Appearance: He is well-developed.   HENT:      Head: Normocephalic and atraumatic.   Eyes:      Conjunctiva/sclera: Conjunctivae normal.   Cardiovascular:      Rate and Rhythm: Normal rate and regular rhythm.      Heart sounds: No murmur heard.  Pulmonary:      Effort: Pulmonary effort is normal. No respiratory distress.      Breath sounds: Normal breath sounds.   Abdominal:      Palpations: Abdomen is soft.      Tenderness: There is no abdominal tenderness.   Musculoskeletal:         General: No swelling.      Cervical back: Neck supple.   Skin:     General: Skin is warm and dry.      Capillary Refill: Capillary refill takes less than 2 seconds.   Neurological:      Mental Status: He is alert.   Psychiatric:         Mood and Affect: Mood normal.         Robert Budinetz, MD          "

## 2024-04-23 NOTE — PROGRESS NOTES
"Assessment/Plan:       1. Slow transit constipation  Comments:  stop the sweets/candy  continue fiber/water/probiotic  cut back the oxycodone          Subjective:      Patient ID: Adan Davis is a 43 y.o. male.    Adan is here for f/u.          The following portions of the patient's history were reviewed and updated as appropriate: allergies, current medications, past family history, past medical history, past social history, past surgical history, and problem list.    Review of Systems   Respiratory: Negative.     Cardiovascular: Negative.    Gastrointestinal: Negative.          Objective:      /73 (BP Location: Left arm, Patient Position: Sitting, Cuff Size: Large)   Pulse 58   Ht 5' 10\" (1.778 m)   Wt 95.7 kg (211 lb)   SpO2 100%   BMI 30.28 kg/m²          Physical Exam  Vitals and nursing note reviewed.   Constitutional:       Appearance: Normal appearance.   HENT:      Head: Normocephalic and atraumatic.      Nose: Nose normal.      Mouth/Throat:      Mouth: Mucous membranes are moist.   Eyes:      Extraocular Movements: Extraocular movements intact.      Pupils: Pupils are equal, round, and reactive to light.   Cardiovascular:      Rate and Rhythm: Normal rate and regular rhythm.      Pulses: Normal pulses.   Pulmonary:      Effort: Pulmonary effort is normal.      Breath sounds: Normal breath sounds.   Abdominal:      General: Bowel sounds are normal.      Palpations: Abdomen is soft.   Musculoskeletal:      Cervical back: Normal range of motion.   Skin:     General: Skin is warm and dry.      Capillary Refill: Capillary refill takes less than 2 seconds.   Neurological:      General: No focal deficit present.      Mental Status: He is alert.   Psychiatric:         Mood and Affect: Mood normal.         "

## 2024-04-30 ENCOUNTER — TELEPHONE (OUTPATIENT)
Dept: GASTROENTEROLOGY | Facility: MEDICAL CENTER | Age: 44
End: 2024-04-30

## 2024-04-30 NOTE — TELEPHONE ENCOUNTER
Confirming Upcoming Procedure: Colonoscopy on May 7  Physician performing: Dr. Amanda  [facility] of procedure:  [facility]  Prep: Miralax

## 2024-05-06 ENCOUNTER — ANESTHESIA EVENT (OUTPATIENT)
Dept: ANESTHESIOLOGY | Facility: HOSPITAL | Age: 44
End: 2024-05-06

## 2024-05-06 ENCOUNTER — ANESTHESIA (OUTPATIENT)
Dept: ANESTHESIOLOGY | Facility: HOSPITAL | Age: 44
End: 2024-05-06

## 2024-05-06 RX ORDER — SODIUM CHLORIDE 9 MG/ML
125 INJECTION, SOLUTION INTRAVENOUS CONTINUOUS
Status: CANCELLED | OUTPATIENT
Start: 2024-05-06

## 2024-05-06 NOTE — ANESTHESIA PREPROCEDURE EVALUATION
Procedure:  PRE-OP ONLY    Relevant Problems   ANESTHESIA (within normal limits)      CARDIO   (+) Benign essential HTN      ENDO (within normal limits)      GI/HEPATIC (within normal limits)      /RENAL (within normal limits)      GYN (within normal limits)      HEMATOLOGY (within normal limits)      MUSCULOSKELETAL   (+) Back pain   (+) DDD (degenerative disc disease), lumbosacral      NEURO/PSYCH   (+) Anxiety   (+) Other headache syndrome   (+) Stress response      PULMONARY (within normal limits)             Anesthesia Plan  ASA Score- 2     Anesthesia Type- IV sedation with anesthesia with ASA Monitors.         Additional Monitors:     Airway Plan:            Plan Factors-Exercise tolerance (METS): >4 METS.    Chart reviewed.        Patient is not a current smoker.              Induction- intravenous.    Postoperative Plan-     Informed Consent- Anesthetic plan and risks discussed with patient.

## 2024-05-07 ENCOUNTER — ANESTHESIA (OUTPATIENT)
Dept: GASTROENTEROLOGY | Facility: MEDICAL CENTER | Age: 44
End: 2024-05-07

## 2024-05-07 ENCOUNTER — HOSPITAL ENCOUNTER (OUTPATIENT)
Dept: GASTROENTEROLOGY | Facility: MEDICAL CENTER | Age: 44
Setting detail: OUTPATIENT SURGERY
Discharge: HOME | End: 2024-05-07
Payer: COMMERCIAL

## 2024-05-07 ENCOUNTER — ANESTHESIA EVENT (OUTPATIENT)
Dept: GASTROENTEROLOGY | Facility: MEDICAL CENTER | Age: 44
End: 2024-05-07

## 2024-05-07 VITALS
SYSTOLIC BLOOD PRESSURE: 110 MMHG | DIASTOLIC BLOOD PRESSURE: 54 MMHG | RESPIRATION RATE: 18 BRPM | OXYGEN SATURATION: 100 % | TEMPERATURE: 98 F | HEART RATE: 62 BPM

## 2024-05-07 DIAGNOSIS — Z12.11 SCREENING FOR COLON CANCER: ICD-10-CM

## 2024-05-07 PROCEDURE — 88305 TISSUE EXAM BY PATHOLOGIST: CPT | Performed by: STUDENT IN AN ORGANIZED HEALTH CARE EDUCATION/TRAINING PROGRAM

## 2024-05-07 PROCEDURE — 45380 COLONOSCOPY AND BIOPSY: CPT | Performed by: STUDENT IN AN ORGANIZED HEALTH CARE EDUCATION/TRAINING PROGRAM

## 2024-05-07 PROCEDURE — 45385 COLONOSCOPY W/LESION REMOVAL: CPT | Performed by: STUDENT IN AN ORGANIZED HEALTH CARE EDUCATION/TRAINING PROGRAM

## 2024-05-07 RX ORDER — SODIUM CHLORIDE 9 MG/ML
125 INJECTION, SOLUTION INTRAVENOUS CONTINUOUS
Status: DISCONTINUED | OUTPATIENT
Start: 2024-05-07 | End: 2024-05-11 | Stop reason: HOSPADM

## 2024-05-07 RX ORDER — PROPOFOL 10 MG/ML
INJECTION, EMULSION INTRAVENOUS AS NEEDED
Status: DISCONTINUED | OUTPATIENT
Start: 2024-05-07 | End: 2024-05-07

## 2024-05-07 RX ORDER — LIDOCAINE HYDROCHLORIDE 20 MG/ML
INJECTION, SOLUTION EPIDURAL; INFILTRATION; INTRACAUDAL; PERINEURAL AS NEEDED
Status: DISCONTINUED | OUTPATIENT
Start: 2024-05-07 | End: 2024-05-07

## 2024-05-07 RX ADMIN — PROPOFOL 150 MG: 10 INJECTION, EMULSION INTRAVENOUS at 13:52

## 2024-05-07 RX ADMIN — PROPOFOL 50 MG: 10 INJECTION, EMULSION INTRAVENOUS at 14:02

## 2024-05-07 RX ADMIN — PROPOFOL 50 MG: 10 INJECTION, EMULSION INTRAVENOUS at 14:07

## 2024-05-07 RX ADMIN — SODIUM CHLORIDE 125 ML/HR: 0.9 INJECTION, SOLUTION INTRAVENOUS at 13:41

## 2024-05-07 RX ADMIN — PROPOFOL 50 MG: 10 INJECTION, EMULSION INTRAVENOUS at 13:57

## 2024-05-07 RX ADMIN — PROPOFOL 150 MG: 10 INJECTION, EMULSION INTRAVENOUS at 13:48

## 2024-05-07 RX ADMIN — Medication 40 MG: at 14:00

## 2024-05-07 RX ADMIN — LIDOCAINE HYDROCHLORIDE 100 MG: 20 INJECTION, SOLUTION EPIDURAL; INFILTRATION; INTRACAUDAL at 13:48

## 2024-05-07 NOTE — H&P
History and Physical -  [facility]  Adan Davis 43 y.o. male MRN: [id number]          HPI: Adan Davis is a 43 y.o. year old male who presents for open access initial screening colonoscopy.      REVIEW OF SYSTEMS: Per the HPI, and otherwise unremarkable.    Historical Information   Past Medical History:   Diagnosis Date    Hypertension     Insomnia     Meningitis      Past Surgical History:   Procedure Laterality Date    ABSCESS DRAINAGE      COLONOSCOPY  04/05/2014    INGUINAL HERNIA REPAIR       Social History   Social History     Substance and Sexual Activity   Alcohol Use Not Currently     Social History     Substance and Sexual Activity   Drug Use Never     Social History     Tobacco Use   Smoking Status Never    Passive exposure: Never   Smokeless Tobacco Never     Family History   Problem Relation Age of Onset    Hypertension Mother     Hypertension Father     Colon cancer Paternal Grandfather        Meds/Allergies       Current Outpatient Medications:     ALPRAZolam (XANAX) 0.25 mg tablet    amitriptyline (ELAVIL) 10 mg tablet    co-enzyme Q-10 100 mg capsule    oxyCODONE (ROXICODONE) 5 immediate release tablet    No Known Allergies    Objective     There were no vitals taken for this visit.      PHYSICAL EXAM    GEN: NAD  CARDIO: RRR  PULM: CTA bilaterally  ABD: soft, non-tender, non-distended  EXT: no lower extremity edema  NEURO: AAOx3      ASSESSMENT/PLAN:  43 y.o. year old male here for colonoscopy; he is stable and optimized for his procedure.

## 2024-05-07 NOTE — ANESTHESIA POSTPROCEDURE EVALUATION
Post-Op Assessment Note    CV Status:  Stable    Pain management: adequate       Mental Status:  Alert and awake   Hydration Status:  Euvolemic   PONV Controlled:  Controlled   Airway Patency:  Patent     Post Op Vitals Reviewed: Yes    No anethesia notable event occurred.    Staff: Anesthesiologist               BP      Temp      Pulse     Resp      SpO2      /54   Pulse 62   Temp 98 °F (36.7 °C) (Temporal)   Resp 18   SpO2 100%

## 2024-05-07 NOTE — DISCHARGE INSTRUCTIONS
Colonoscopy   WHAT YOU NEED TO KNOW:   A colonoscopy is a procedure to examine the inside of your colon (intestine) with a scope. Polyps or tissue growths may have been removed during your colonoscopy. It is normal to feel bloated and to have some abdominal discomfort. You should be passing gas. If you have hemorrhoids or you had polyps removed, you may have a small amount of bleeding.        DISCHARGE INSTRUCTIONS:   Seek care immediately if:   You have sudden, severe abdominal pain.     You have problems swallowing.     You have a large amount of black, sticky bowel movements or blood in your bowel movements.     You have sudden trouble breathing.     You feel weak, lightheaded, or faint or your heart beats faster than normal for you.     Contact your healthcare provider if:   You have a fever and chills.      You have nausea or are vomiting.      Your abdomen is bloated or feels full and hard.     You have abdominal pain.   You have black, sticky bowel movements or blood in your bowel movements.  You have not had a bowel movement for 3 days after your procedure.  You have rash or hives.  You have questions or concerns about your procedure.    Activity:   Do not lift, strain, or run for 24 hours after your procedure.     Rest after your procedure. You have been given medicine to relax you. Do not drive or make important decisions until the day after your procedure. Return to your normal activity as directed.     Relieve gas and discomfort from bloating by lying on your right side with a heating pad on your abdomen. You may need to take short walks to help the gas move out. Eat small meals until bloating is relieved.  Follow up with your healthcare provider as directed: Write down your questions so you remember to ask them during your visits.     If you take a “blood thinner”, please review the specific instructions from your endoscopist about when you should resume it. These can be found in the “Recommendation”  and “Your Medication list” sections of this After Visit Summary.       Abdomen soft, non-tender, no guarding.

## 2024-05-07 NOTE — ANESTHESIA PREPROCEDURE EVALUATION
Procedure:  COLONOSCOPY    Relevant Problems   ANESTHESIA (within normal limits)      CARDIO   (+) Benign essential HTN      ENDO (within normal limits)      GI/HEPATIC (within normal limits)      /RENAL (within normal limits)      GYN (within normal limits)      HEMATOLOGY (within normal limits)      MUSCULOSKELETAL   (+) Back pain   (+) DDD (degenerative disc disease), lumbosacral      NEURO/PSYCH   (+) Anxiety   (+) Other headache syndrome   (+) Stress response      PULMONARY (within normal limits)        Physical Exam    Airway    Mallampati score: II  TM Distance: >3 FB       Dental   Comment: Invisalign removed, No notable dental hx     Cardiovascular  Rhythm: regular, Rate: normal, Cardiovascular exam normal    Pulmonary  Pulmonary exam normal Breath sounds clear to auscultation    Other Findings        Anesthesia Plan  ASA Score- 2     Anesthesia Type- IV sedation with anesthesia with ASA Monitors.         Additional Monitors:     Airway Plan:            Plan Factors-Exercise tolerance (METS): >4 METS.    Chart reviewed.        Patient is not a current smoker.              Induction- intravenous.    Postoperative Plan-     Informed Consent- Anesthetic plan and risks discussed with patient.

## 2024-05-13 PROCEDURE — 88305 TISSUE EXAM BY PATHOLOGIST: CPT | Performed by: STUDENT IN AN ORGANIZED HEALTH CARE EDUCATION/TRAINING PROGRAM

## 2024-05-14 DIAGNOSIS — G89.4 CHRONIC PAIN SYNDROME: ICD-10-CM

## 2024-05-14 RX ORDER — OXYCODONE HYDROCHLORIDE 5 MG/1
5 TABLET ORAL EVERY 6 HOURS PRN
Qty: 90 TABLET | Refills: 0 | Status: SHIPPED | OUTPATIENT
Start: 2024-05-14 | End: 2024-05-21 | Stop reason: SDUPTHER

## 2024-05-21 ENCOUNTER — OFFICE VISIT (OUTPATIENT)
Dept: FAMILY MEDICINE CLINIC | Facility: CLINIC | Age: 44
End: 2024-05-21
Payer: COMMERCIAL

## 2024-05-21 VITALS
WEIGHT: 214.6 LBS | BODY MASS INDEX: 30.72 KG/M2 | DIASTOLIC BLOOD PRESSURE: 69 MMHG | HEIGHT: 70 IN | HEART RATE: 81 BPM | OXYGEN SATURATION: 99 % | SYSTOLIC BLOOD PRESSURE: 132 MMHG

## 2024-05-21 DIAGNOSIS — K64.8 INTERNAL HEMORRHOIDS: ICD-10-CM

## 2024-05-21 DIAGNOSIS — G89.4 CHRONIC PAIN SYNDROME: ICD-10-CM

## 2024-05-21 DIAGNOSIS — F41.9 ANXIETY AND DEPRESSION: Primary | ICD-10-CM

## 2024-05-21 DIAGNOSIS — K59.00 CONSTIPATION, UNSPECIFIED CONSTIPATION TYPE: ICD-10-CM

## 2024-05-21 DIAGNOSIS — F32.A ANXIETY AND DEPRESSION: Primary | ICD-10-CM

## 2024-05-21 PROCEDURE — 99214 OFFICE O/P EST MOD 30 MIN: CPT | Performed by: FAMILY MEDICINE

## 2024-05-21 RX ORDER — BUPROPION HYDROCHLORIDE 150 MG/1
150 TABLET ORAL EVERY MORNING
Qty: 30 TABLET | Refills: 5 | Status: SHIPPED | OUTPATIENT
Start: 2024-05-21 | End: 2024-11-17

## 2024-05-21 RX ORDER — OXYCODONE HYDROCHLORIDE 5 MG/1
5 TABLET ORAL EVERY 6 HOURS PRN
Qty: 60 TABLET | Refills: 0 | Status: SHIPPED | OUTPATIENT
Start: 2024-05-21

## 2024-05-21 NOTE — PROGRESS NOTES
"Assessment/Plan:       1. Anxiety and depression  Comments:  start wellbutrin  Orders:  -     buPROPion (WELLBUTRIN XL) 150 mg 24 hr tablet; Take 1 tablet (150 mg total) by mouth every morning  2. Internal hemorrhoids  Comments:  Refer to colon surgery  Orders:  -     Ambulatory Referral to Colorectal Surgery; Future  3. Constipation, unspecified constipation type  Comments:  continue stool softener  diet/stool softener/probiotic  4. Chronic pain syndrome  -     oxyCODONE (ROXICODONE) 5 immediate release tablet; Take 1 tablet (5 mg total) by mouth every 6 (six) hours as needed for moderate pain Max Daily Amount: 20 mg        Subjective:      Patient ID: Adan Davis is a 43 y.o. male.    Adan had a colonoscopy and was found to have 2 benign polyps.  The constipation is a little better with a stool softener.  He did try fiber and probiotics.  He is still feeling very tired we think anxiety and/or depression might be playing a role could be allergies as well.  He is going to try nasal steroid and restart Wellbutrin.  I reviewed potential side effects.        The following portions of the patient's history were reviewed and updated as appropriate: allergies, current medications, past family history, past medical history, past social history, past surgical history, and problem list.    Review of Systems   Respiratory: Negative.     Cardiovascular: Negative.    Gastrointestinal: Negative.          Objective:      /69 (BP Location: Right arm, Patient Position: Sitting, Cuff Size: Large)   Pulse 81   Ht 5' 10\" (1.778 m)   Wt 97.3 kg (214 lb 9.6 oz)   SpO2 99%   BMI 30.79 kg/m²          Physical Exam  Vitals and nursing note reviewed.   Constitutional:       Appearance: Normal appearance.   HENT:      Head: Normocephalic and atraumatic.      Nose: Nose normal.      Mouth/Throat:      Mouth: Mucous membranes are moist.   Eyes:      Extraocular Movements: Extraocular movements intact.      Pupils: Pupils are " equal, round, and reactive to light.   Cardiovascular:      Rate and Rhythm: Normal rate and regular rhythm.      Pulses: Normal pulses.   Pulmonary:      Effort: Pulmonary effort is normal.      Breath sounds: Normal breath sounds.   Abdominal:      General: Bowel sounds are normal.      Palpations: Abdomen is soft.   Musculoskeletal:      Cervical back: Normal range of motion.   Skin:     General: Skin is warm and dry.      Capillary Refill: Capillary refill takes less than 2 seconds.   Neurological:      General: No focal deficit present.      Mental Status: He is alert.   Psychiatric:         Mood and Affect: Mood normal.

## 2024-06-12 DIAGNOSIS — F41.9 ANXIETY AND DEPRESSION: ICD-10-CM

## 2024-06-12 DIAGNOSIS — F32.A ANXIETY AND DEPRESSION: ICD-10-CM

## 2024-06-12 RX ORDER — BUPROPION HYDROCHLORIDE 150 MG/1
150 TABLET ORAL EVERY MORNING
Qty: 90 TABLET | Refills: 1 | Status: SHIPPED | OUTPATIENT
Start: 2024-06-12

## 2024-06-21 DIAGNOSIS — G89.4 CHRONIC PAIN SYNDROME: ICD-10-CM

## 2024-06-21 RX ORDER — OXYCODONE HYDROCHLORIDE 5 MG/1
5 TABLET ORAL EVERY 6 HOURS PRN
Qty: 60 TABLET | Refills: 0 | Status: SHIPPED | OUTPATIENT
Start: 2024-06-21

## 2024-06-26 NOTE — TELEPHONE ENCOUNTER
"    Caller: Wilberto Madison \"Del\"    Relationship to patient: Self    Best call back number: [phone]    Date of positive COVID19 test: 06/26      COVID19 symptoms: SNEEZING, RUNNY NOSE    Additional information or concerns: PATIENT WOULD LIKE TO KNOW IF PLAVIX CAN BE CALLED IN.     What is the patients preferred pharmacy: [id number] [place], [place] - [address] [address] [facility] - [phone] [phone] [place], [place] [id number]           " ----- Message from Robert Budinetz, MD sent at 3/29/2024  1:20 AM EDT -----  Normal labs

## 2024-07-08 DIAGNOSIS — G89.4 CHRONIC PAIN SYNDROME: ICD-10-CM

## 2024-07-08 RX ORDER — OXYCODONE HYDROCHLORIDE 5 MG/1
5 TABLET ORAL EVERY 6 HOURS PRN
Qty: 60 TABLET | Refills: 0 | Status: SHIPPED | OUTPATIENT
Start: 2024-07-08

## 2024-07-24 ENCOUNTER — TELEPHONE (OUTPATIENT)
Age: 44
End: 2024-07-24

## 2024-07-24 DIAGNOSIS — G89.4 CHRONIC PAIN SYNDROME: ICD-10-CM

## 2024-07-24 RX ORDER — OXYCODONE HYDROCHLORIDE 5 MG/1
5 TABLET ORAL EVERY 6 HOURS PRN
Qty: 60 TABLET | Refills: 0 | Status: SHIPPED | OUTPATIENT
Start: 2024-07-24

## 2024-07-24 NOTE — TELEPHONE ENCOUNTER
Darin from [facility] requesting an update on Insurance referral.  Patient is scheduled for his MRI 7/27/2024      Referral Pending     Fax [phone]

## 2024-07-29 ENCOUNTER — TELEPHONE (OUTPATIENT)
Dept: FAMILY MEDICINE CLINIC | Facility: CLINIC | Age: 44
End: 2024-07-29

## 2024-07-29 DIAGNOSIS — G89.29 CHRONIC NECK PAIN: Primary | ICD-10-CM

## 2024-07-29 DIAGNOSIS — M54.2 CHRONIC NECK PAIN: Primary | ICD-10-CM

## 2024-07-29 NOTE — TELEPHONE ENCOUNTER
MRI is not covered at this time. Patient must have an evaluation with PT. Dr Budinetz placed referral to have patient set up appt with PT.     Lm for patient to call back to confirm he received our message.

## 2024-07-30 NOTE — TELEPHONE ENCOUNTER
Pt returning callback. Relayed message below. Pt was not sure about wanting to do PT and stated he will reach out to his insurance.

## 2024-08-09 DIAGNOSIS — G89.4 CHRONIC PAIN SYNDROME: ICD-10-CM

## 2024-08-09 RX ORDER — OXYCODONE HYDROCHLORIDE 5 MG/1
5 TABLET ORAL EVERY 6 HOURS PRN
Qty: 60 TABLET | Refills: 0 | Status: SHIPPED | OUTPATIENT
Start: 2024-08-09

## 2024-08-29 DIAGNOSIS — G89.4 CHRONIC PAIN SYNDROME: ICD-10-CM

## 2024-08-29 RX ORDER — OXYCODONE HYDROCHLORIDE 5 MG/1
5 TABLET ORAL EVERY 6 HOURS PRN
Qty: 60 TABLET | Refills: 0 | Status: SHIPPED | OUTPATIENT
Start: 2024-08-29

## 2024-09-16 DIAGNOSIS — G89.4 CHRONIC PAIN SYNDROME: ICD-10-CM

## 2024-09-17 RX ORDER — OXYCODONE HYDROCHLORIDE 5 MG/1
5 TABLET ORAL EVERY 6 HOURS PRN
Qty: 60 TABLET | Refills: 0 | Status: SHIPPED | OUTPATIENT
Start: 2024-09-17

## 2024-10-04 DIAGNOSIS — G89.4 CHRONIC PAIN SYNDROME: ICD-10-CM

## 2024-10-04 RX ORDER — OXYCODONE HYDROCHLORIDE 5 MG/1
5 TABLET ORAL EVERY 6 HOURS PRN
Qty: 60 TABLET | Refills: 0 | Status: SHIPPED | OUTPATIENT
Start: 2024-10-04

## 2024-10-22 DIAGNOSIS — G89.4 CHRONIC PAIN SYNDROME: ICD-10-CM

## 2024-10-22 RX ORDER — OXYCODONE HYDROCHLORIDE 5 MG/1
5 TABLET ORAL EVERY 6 HOURS PRN
Qty: 60 TABLET | Refills: 0 | Status: SHIPPED | OUTPATIENT
Start: 2024-10-22

## 2024-11-08 DIAGNOSIS — G89.4 CHRONIC PAIN SYNDROME: ICD-10-CM

## 2024-11-08 RX ORDER — OXYCODONE HYDROCHLORIDE 5 MG/1
5 TABLET ORAL EVERY 6 HOURS PRN
Qty: 60 TABLET | Refills: 0 | Status: SHIPPED | OUTPATIENT
Start: 2024-11-08

## 2024-11-24 DIAGNOSIS — G89.4 CHRONIC PAIN SYNDROME: ICD-10-CM

## 2024-11-25 RX ORDER — OXYCODONE HYDROCHLORIDE 5 MG/1
5 TABLET ORAL EVERY 6 HOURS PRN
Qty: 60 TABLET | Refills: 0 | Status: SHIPPED | OUTPATIENT
Start: 2024-11-25

## 2024-12-13 DIAGNOSIS — G89.4 CHRONIC PAIN SYNDROME: ICD-10-CM

## 2024-12-13 RX ORDER — OXYCODONE HYDROCHLORIDE 5 MG/1
5 TABLET ORAL EVERY 6 HOURS PRN
Qty: 60 TABLET | Refills: 0 | Status: SHIPPED | OUTPATIENT
Start: 2024-12-13

## 2024-12-13 NOTE — TELEPHONE ENCOUNTER
Patient stated that his previous message was probably misleading. Patient explained that what he meant was, that he currently is prescribed oxycodone (roxycodone) for a 15 day supply, however he is fine pushing it out to 3 times a day with a 60 day supply. Patient mentioned that he has run out of this medication and it would be greatly appreciated if a refill could be sent sometime today to [facility] pharmacy. Please advise.

## 2024-12-31 DIAGNOSIS — G89.4 CHRONIC PAIN SYNDROME: ICD-10-CM

## 2025-01-02 RX ORDER — OXYCODONE HYDROCHLORIDE 5 MG/1
5 TABLET ORAL EVERY 6 HOURS PRN
Qty: 60 TABLET | Refills: 0 | Status: SHIPPED | OUTPATIENT
Start: 2025-01-02

## 2025-01-20 DIAGNOSIS — G89.4 CHRONIC PAIN SYNDROME: ICD-10-CM

## 2025-01-20 NOTE — TELEPHONE ENCOUNTER
Medication:  PDMP   Active agreement on file -No  Patient not seen in office since 5/21/24, needs appointment

## 2025-01-21 RX ORDER — OXYCODONE HYDROCHLORIDE 5 MG/1
5 TABLET ORAL EVERY 6 HOURS PRN
Qty: 60 TABLET | Refills: 0 | Status: SHIPPED | OUTPATIENT
Start: 2025-01-21

## 2025-02-07 DIAGNOSIS — G89.4 CHRONIC PAIN SYNDROME: ICD-10-CM

## 2025-02-07 RX ORDER — OXYCODONE HYDROCHLORIDE 5 MG/1
5 TABLET ORAL EVERY 6 HOURS PRN
Qty: 60 TABLET | Refills: 0 | Status: SHIPPED | OUTPATIENT
Start: 2025-02-07

## 2025-02-10 ENCOUNTER — TELEPHONE (OUTPATIENT)
Age: 45
End: 2025-02-10

## 2025-02-10 DIAGNOSIS — J11.1 INFLUENZA: Primary | ICD-10-CM

## 2025-02-10 RX ORDER — OSELTAMIVIR PHOSPHATE 75 MG/1
75 CAPSULE ORAL EVERY 12 HOURS SCHEDULED
Qty: 10 CAPSULE | Refills: 0 | Status: SHIPPED | OUTPATIENT
Start: 2025-02-10 | End: 2025-02-15

## 2025-02-10 NOTE — TELEPHONE ENCOUNTER
Patient's spouse called stating that  picked up flu from his kids and was calling to see if medication can be sent to pharmacy for him. I advised patient hasn't been seen since 5/21/24 so Dr Budinetz might want to see him. Spouse stated patient was unable to come in because he wasn't feeling well. I advised I would send message to provider to see what can be done. Patient would like a call back; [phone].

## 2025-02-25 DIAGNOSIS — G89.4 CHRONIC PAIN SYNDROME: ICD-10-CM

## 2025-02-25 RX ORDER — OXYCODONE HYDROCHLORIDE 5 MG/1
5 TABLET ORAL EVERY 6 HOURS PRN
Qty: 60 TABLET | Refills: 0 | Status: SHIPPED | OUTPATIENT
Start: 2025-02-25

## 2025-02-25 NOTE — TELEPHONE ENCOUNTER
Medication:  PDMP   Active agreement on file -No  Patient not seen since 5/21/24 no future apt scheduled

## 2025-03-13 DIAGNOSIS — G89.4 CHRONIC PAIN SYNDROME: ICD-10-CM

## 2025-03-13 RX ORDER — OXYCODONE HYDROCHLORIDE 5 MG/1
5 TABLET ORAL EVERY 6 HOURS PRN
Qty: 60 TABLET | Refills: 0 | Status: SHIPPED | OUTPATIENT
Start: 2025-03-13

## 2025-03-31 DIAGNOSIS — G89.4 CHRONIC PAIN SYNDROME: ICD-10-CM

## 2025-04-01 RX ORDER — OXYCODONE HYDROCHLORIDE 5 MG/1
5 TABLET ORAL EVERY 6 HOURS PRN
Qty: 60 TABLET | Refills: 0 | Status: SHIPPED | OUTPATIENT
Start: 2025-04-01

## 2025-04-07 NOTE — TELEPHONE ENCOUNTER
Best contact number for patient:  [phone]  Emergency Contact name and number:  Beauty [employer]: [phone]  Referring provider and telephone number:  Self referral  Primary Care Provider Name and if affiliated with [facility]:     Reason for Appointment/Dx:dizzness, HA, blurred vision, pressure    Have you seen and followed up with a pediatric Neurologist for this disease in the past?      No (If yes ok to schedule with Dr Chun Tejeda)    Neurology Location patient would like to be seen:    Order received? No                                                 Records Received? No  If no, explain: [facility] per wife and she will obtain records, fax # was provided    Have you ever seen another Neurologist?       Yes, [facility] per wife and she will obtain records, fax # was provided    [employer] Name:    ID/Policy #:    Secondary Insurance:    ID/Policy#: Workman's Comp/ Accident/ School  Information      Workman's Comp/Accident/School related? No    If yes name of Insurance company:    Claim #:    Date of Injury:    Type of Injury:     Name and Telephone Number:    Notes:                   Appointment date: 08/09/2021 @ 9:00am, w/Dr Terence Mariano in [facility]  Sent appt details/directions  Added and verified insurance, verified address  Appointment made by wife  Statement Selected

## 2025-04-18 DIAGNOSIS — G89.4 CHRONIC PAIN SYNDROME: ICD-10-CM

## 2025-04-18 RX ORDER — OXYCODONE HYDROCHLORIDE 5 MG/1
5 TABLET ORAL EVERY 6 HOURS PRN
Qty: 60 TABLET | Refills: 0 | Status: SHIPPED | OUTPATIENT
Start: 2025-04-18

## 2025-05-05 DIAGNOSIS — G89.4 CHRONIC PAIN SYNDROME: ICD-10-CM

## 2025-05-06 RX ORDER — OXYCODONE HYDROCHLORIDE 5 MG/1
5 TABLET ORAL EVERY 6 HOURS PRN
Qty: 60 TABLET | Refills: 0 | Status: SHIPPED | OUTPATIENT
Start: 2025-05-06

## 2025-05-23 DIAGNOSIS — G89.4 CHRONIC PAIN SYNDROME: ICD-10-CM

## 2025-05-23 RX ORDER — OXYCODONE HYDROCHLORIDE 5 MG/1
5 TABLET ORAL EVERY 6 HOURS PRN
Qty: 60 TABLET | Refills: 0 | Status: SHIPPED | OUTPATIENT
Start: 2025-05-23

## 2025-06-09 DIAGNOSIS — G89.4 CHRONIC PAIN SYNDROME: ICD-10-CM

## 2025-06-09 DIAGNOSIS — R53.83 OTHER FATIGUE: ICD-10-CM

## 2025-06-09 DIAGNOSIS — Z13.220 LIPID SCREENING: ICD-10-CM

## 2025-06-09 DIAGNOSIS — E53.8 B12 DEFICIENCY: ICD-10-CM

## 2025-06-09 DIAGNOSIS — E55.9 VITAMIN D DEFICIENCY: Primary | ICD-10-CM

## 2025-06-09 RX ORDER — OXYCODONE HYDROCHLORIDE 5 MG/1
5 TABLET ORAL EVERY 6 HOURS PRN
Qty: 60 TABLET | Refills: 0 | Status: SHIPPED | OUTPATIENT
Start: 2025-06-09

## 2025-06-27 DIAGNOSIS — G89.4 CHRONIC PAIN SYNDROME: ICD-10-CM

## 2025-06-27 DIAGNOSIS — F41.9 ANXIETY: ICD-10-CM

## 2025-06-30 LAB
25(OH)D3 SERPL-MCNC: 76 NG/ML (ref 30–100)
ALBUMIN SERPL-MCNC: 4.7 G/DL (ref 3.6–5.1)
ALBUMIN/GLOB SERPL: 1.7 (CALC) (ref 1–2.5)
ALP SERPL-CCNC: 63 U/L (ref 36–130)
ALT SERPL-CCNC: 13 U/L (ref 9–46)
AST SERPL-CCNC: 19 U/L (ref 10–40)
BASOPHILS # BLD AUTO: 50 CELLS/UL (ref 0–200)
BASOPHILS NFR BLD AUTO: 0.8 %
BILIRUB SERPL-MCNC: 0.6 MG/DL (ref 0.2–1.2)
BUN SERPL-MCNC: 18 MG/DL (ref 7–25)
BUN/CREAT SERPL: 12 (CALC) (ref 6–22)
CALCIUM SERPL-MCNC: 10.1 MG/DL (ref 8.6–10.3)
CHLORIDE SERPL-SCNC: 105 MMOL/L (ref 98–110)
CHOLEST SERPL-MCNC: 165 MG/DL
CHOLEST/HDLC SERPL: 3.5 (CALC)
CO2 SERPL-SCNC: 29 MMOL/L (ref 20–32)
CREAT SERPL-MCNC: 1.45 MG/DL (ref 0.6–1.29)
EOSINOPHIL # BLD AUTO: 223 CELLS/UL (ref 15–500)
EOSINOPHIL NFR BLD AUTO: 3.6 %
ERYTHROCYTE [DISTWIDTH] IN BLOOD BY AUTOMATED COUNT: 12.3 % (ref 11–15)
GFR/BSA.PRED SERPLBLD CYS-BASED-ARV: 60 ML/MIN/1.73M2
GLOBULIN SER CALC-MCNC: 2.7 G/DL (CALC) (ref 1.9–3.7)
GLUCOSE SERPL-MCNC: 93 MG/DL (ref 65–99)
HCT VFR BLD AUTO: 49.4 % (ref 38.5–50)
HDLC SERPL-MCNC: 47 MG/DL
HGB BLD-MCNC: 16.7 G/DL (ref 13.2–17.1)
LDLC SERPL CALC-MCNC: 94 MG/DL (CALC)
LYMPHOCYTES # BLD AUTO: 2610 CELLS/UL (ref 850–3900)
LYMPHOCYTES NFR BLD AUTO: 42.1 %
MCH RBC QN AUTO: 29.9 PG (ref 27–33)
MCHC RBC AUTO-ENTMCNC: 33.8 G/DL (ref 32–36)
MCV RBC AUTO: 88.5 FL (ref 80–100)
MONOCYTES # BLD AUTO: 496 CELLS/UL (ref 200–950)
MONOCYTES NFR BLD AUTO: 8 %
NEUTROPHILS # BLD AUTO: 2821 CELLS/UL (ref 1500–7800)
NEUTROPHILS NFR BLD AUTO: 45.5 %
NONHDLC SERPL-MCNC: 118 MG/DL (CALC)
PLATELET # BLD AUTO: 223 THOUSAND/UL (ref 140–400)
PMV BLD REES-ECKER: 10.2 FL (ref 7.5–12.5)
POTASSIUM SERPL-SCNC: 4.2 MMOL/L (ref 3.5–5.3)
PROT SERPL-MCNC: 7.4 G/DL (ref 6.1–8.1)
RBC # BLD AUTO: 5.58 MILLION/UL (ref 4.2–5.8)
SODIUM SERPL-SCNC: 141 MMOL/L (ref 135–146)
TESTOST SERPL-MCNC: 407 NG/DL (ref 250–827)
TRIGL SERPL-MCNC: 140 MG/DL
TSH SERPL-ACNC: 2.32 MIU/L (ref 0.4–4.5)
VIT B12 SERPL-MCNC: 927 PG/ML (ref 200–1100)
WBC # BLD AUTO: 6.2 THOUSAND/UL (ref 3.8–10.8)

## 2025-06-30 RX ORDER — ALPRAZOLAM 0.25 MG
0.25 TABLET ORAL 3 TIMES DAILY PRN
Qty: 90 TABLET | Refills: 0 | Status: SHIPPED | OUTPATIENT
Start: 2025-06-30

## 2025-06-30 RX ORDER — OXYCODONE HYDROCHLORIDE 5 MG/1
5 TABLET ORAL EVERY 6 HOURS PRN
Qty: 60 TABLET | Refills: 0 | Status: SHIPPED | OUTPATIENT
Start: 2025-06-30

## 2025-07-01 ENCOUNTER — RESULTS FOLLOW-UP (OUTPATIENT)
Dept: FAMILY MEDICINE CLINIC | Facility: CLINIC | Age: 45
End: 2025-07-01

## 2025-07-01 DIAGNOSIS — R79.89 ELEVATED SERUM CREATININE: Primary | ICD-10-CM

## 2025-07-07 NOTE — TELEPHONE ENCOUNTER
Relayed results to patient representative as listed on communication consent form as per provider message. Patient Representative expressed understanding and did not have any further questions. I faxed basic metabolic panel lab order to [facility] in [place] at fax number [phone].

## 2025-07-15 ENCOUNTER — OFFICE VISIT (OUTPATIENT)
Dept: FAMILY MEDICINE CLINIC | Facility: CLINIC | Age: 45
End: 2025-07-15
Payer: COMMERCIAL

## 2025-07-15 VITALS
OXYGEN SATURATION: 100 % | BODY MASS INDEX: 31.3 KG/M2 | SYSTOLIC BLOOD PRESSURE: 139 MMHG | WEIGHT: 218.6 LBS | HEART RATE: 74 BPM | HEIGHT: 70 IN | DIASTOLIC BLOOD PRESSURE: 89 MMHG

## 2025-07-15 DIAGNOSIS — G89.4 CHRONIC PAIN SYNDROME: ICD-10-CM

## 2025-07-15 DIAGNOSIS — Z00.00 ANNUAL PHYSICAL EXAM: ICD-10-CM

## 2025-07-15 DIAGNOSIS — R53.83 OTHER FATIGUE: ICD-10-CM

## 2025-07-15 DIAGNOSIS — I10 BENIGN ESSENTIAL HTN: Primary | ICD-10-CM

## 2025-07-15 PROCEDURE — 99396 PREV VISIT EST AGE 40-64: CPT | Performed by: FAMILY MEDICINE

## 2025-07-15 PROCEDURE — 99214 OFFICE O/P EST MOD 30 MIN: CPT | Performed by: FAMILY MEDICINE

## 2025-07-15 RX ORDER — LOSARTAN POTASSIUM 25 MG/1
25 TABLET ORAL DAILY
Qty: 30 TABLET | Refills: 5 | Status: SHIPPED | OUTPATIENT
Start: 2025-07-15

## 2025-07-15 RX ORDER — OXYCODONE HYDROCHLORIDE 5 MG/1
5 TABLET ORAL EVERY 6 HOURS PRN
Qty: 60 TABLET | Refills: 0 | Status: SHIPPED | OUTPATIENT
Start: 2025-07-15 | End: 2025-07-31 | Stop reason: SDUPTHER

## 2025-07-31 DIAGNOSIS — G89.4 CHRONIC PAIN SYNDROME: ICD-10-CM

## 2025-07-31 RX ORDER — OXYCODONE HYDROCHLORIDE 5 MG/1
5 TABLET ORAL EVERY 6 HOURS PRN
Qty: 60 TABLET | Refills: 0 | Status: SHIPPED | OUTPATIENT
Start: 2025-07-31

## 2025-07-31 NOTE — TELEPHONE ENCOUNTER
[facility]/Population Health Chart Review & Patient Outreach Details For Medication Adherence Project    Reason for Outreach Encounter: 3rd Party payor non-compliance report ([employer], [employer], C, etc)  2.  Patient Outreach Method: Reviewed patient chart   3.   Medication in question:    Diabetes Medications              metFORMIN (GLUCOPHAGE-XR) 500 MG ER 24hr tablet Take 1 tablet (500 mg total) by mouth 2 (two) times daily with meals.                 metformin  last filled  7/8 for 30 day supply      4.  Reviewed and or Updates Made To: Patient Chart  5. Outreach Outcomes and/or actions taken: Patient filled medication and is on track to be adherent  Additional Notes:        Patient has an appt on 4/10 and is wondering if you could place an order for routine blood work for him before he comes in for that

## 2025-08-06 DIAGNOSIS — I10 BENIGN ESSENTIAL HTN: ICD-10-CM

## 2025-08-08 RX ORDER — LOSARTAN POTASSIUM 25 MG/1
25 TABLET ORAL DAILY
Qty: 90 TABLET | Refills: 1 | Status: SHIPPED | OUTPATIENT
Start: 2025-08-08